# Patient Record
Sex: FEMALE | Race: ASIAN | NOT HISPANIC OR LATINO | Employment: STUDENT | URBAN - METROPOLITAN AREA
[De-identification: names, ages, dates, MRNs, and addresses within clinical notes are randomized per-mention and may not be internally consistent; named-entity substitution may affect disease eponyms.]

---

## 2021-06-03 ENCOUNTER — OFFICE VISIT (OUTPATIENT)
Dept: DERMATOLOGY | Facility: CLINIC | Age: 21
End: 2021-06-03
Payer: COMMERCIAL

## 2021-06-03 DIAGNOSIS — L20.84 INTRINSIC ATOPIC DERMATITIS: Primary | ICD-10-CM

## 2021-06-03 PROCEDURE — 99203 OFFICE O/P NEW LOW 30 MIN: CPT | Performed by: PHYSICIAN ASSISTANT

## 2021-06-03 RX ORDER — TACROLIMUS 0.3 MG/G
OINTMENT TOPICAL
Qty: 60 G | Refills: 1 | Status: SHIPPED | OUTPATIENT
Start: 2021-06-03

## 2021-06-03 RX ORDER — NORETHINDRONE ACETATE/ETHINYL ESTRADIOL AND FERROUS FUMARATE 1MG-20(21)
1 KIT ORAL DAILY
COMMUNITY
Start: 2021-03-14

## 2021-06-03 RX ORDER — TRIAMCINOLONE ACETONIDE 1 MG/G
OINTMENT TOPICAL
Qty: 80 G | Refills: 3 | Status: SHIPPED | OUTPATIENT
Start: 2021-06-03 | End: 2022-08-18

## 2021-06-03 ASSESSMENT — PAIN SCALES - GENERAL: PAINLEVEL: NO PAIN (0)

## 2021-06-03 NOTE — LETTER
Date:June 25, 2021      Patient was self referred, no letter generated. Do not send.        [facility] Health Information

## 2021-06-03 NOTE — NURSING NOTE
Dermatology Rooming Note    Crys Steel's goals for this visit include:   Chief Complaint   Patient presents with     Skin Check     Crys is here for a skin check. She has chronic dry and flaky skin. She does note burning on her skin as well.     Mercedes Brown CMA

## 2021-06-03 NOTE — LETTER
6/3/2021       RE: Crys Steel  [id number] [address] [facility] [address]     Dear Colleague,    Thank you for referring your patient, Crys Steel, to the [facility] DERMATOLOGY [facility] [place] at [facility]. Please see a copy of my visit note below.    [facility] Dermatology Note  Encounter Date: Eduard 3, 2021  Office Visit      Dermatology Problem List:  1. Intrinsic Atopic dermatitis, diffuse, flaring currently  -protopic 0.03% oint - face, triamcinolone 0.1% ointment- trunk  -wet wraps, cerave moinsturizer  ____________________________________________    Assessment & Plan:  # Diffuse atopic dermatitis. Face, arms, legs, trunk. Ongoing past two yars. No hx of atopy, no fhx atopic derm.   - Edu on etiology, underlying cause  - dry skin care hand out provided and discussed in detail  -wet wraps tonight and for next 2-3 days - instructions provided to patient  -edu on importance of diligent emollients several times daily  -protopic 0.03% oint to face BID  -TAC 0.1% ointment to hot spots on trunk/extremities  -steroid edu given  -future: phototherapy    Procedures Performed:   none    Follow-up: 6 week(s) in-person, or earlier for new or changing lesions    Staff:     All risks, benefits and alternatives were discussed with patient.  Patient is in agreement and understands the assessment and plan.  All questions were answered.    Estela Christiansen PA-C, MPAS  [facility]: Phone: [phone], Fax: [phone]  [facility]: Phone: [phone],  Fax: [phone]  ____________________________________________    CC: Skin Check (Crys is here for a skin check. She has chronic dry and flaky skin. She does note burning on her skin as well.)      HPI:  Ms. Crys Steel is a 20 year old female who presents today as a new  patient for a Skin rash. She has had it for the past two years. No hx of eczema as a child. Very itchy. No fhx with atopic derm, allergies or eczema. Gets worse in the winter. Does not spend a lot of time in the sun. Currently using honest brand eczema oat mild body wash for babies. She tried la rosh possay eczema lotion, previously used aveeno without benefit. She tried vanicream as well, no benefit. She tried cortisone cream which helped a little bit. Previously had a rx ointment which worked better, but unsure of what this is.     Patient is otherwise feeling well, without additional concerns.    Labs:  none    Physical Exam:  Vitals: There were no vitals taken for this visit.  SKIN: Full skin, which includes the head/face, both arms, chest, back, abdomen,both legs, genitalia and/or groin buttocks, digits and/or nails, was examined.   - Chandra's skin type III  - There are pink scaly patches and plaques on the face, bilateral arms, legs, trunk, back, chest, buttocks - fairly diffuse.   - No other lesions of concern on areas examined.     Medications:  Current Outpatient Medications   Medication     ALEJANDRO FE 1/20 1-20 MG-MCG tablet     No current facility-administered medications for this visit.       Past Medical/Surgical History:   There is no problem list on file for this patient.    History reviewed. No pertinent past medical history.    CC Dr. Dr. Kilgore on close of this encounter.                   Again, thank you for allowing me to participate in the care of your patient.      Sincerely,    Estela Christiansen PA-C

## 2021-06-03 NOTE — PROGRESS NOTES
[facility] Dermatology Note  Encounter Date: Eduard 3, 2021  Office Visit      Dermatology Problem List:  1. Intrinsic Atopic dermatitis, diffuse, flaring currently  -protopic 0.03% oint - face, triamcinolone 0.1% ointment- trunk  -wet wraps, cerave moinsturizer  ____________________________________________    Assessment & Plan:  # Diffuse atopic dermatitis. Face, arms, legs, trunk. Ongoing past two yars. No hx of atopy, no fhx atopic derm.   - Edu on etiology, underlying cause  - dry skin care hand out provided and discussed in detail  -wet wraps tonight and for next 2-3 days - instructions provided to patient  -edu on importance of diligent emollients several times daily  -protopic 0.03% oint to face BID  -TAC 0.1% ointment to hot spots on trunk/extremities  -steroid edu given  -future: phototherapy    Procedures Performed:   none    Follow-up: 6 week(s) in-person, or earlier for new or changing lesions    Staff:     All risks, benefits and alternatives were discussed with patient.  Patient is in agreement and understands the assessment and plan.  All questions were answered.    Estela Christiansen PA-C, MPAS  [facility]: Phone: [phone], Fax: [phone]  [facility]: Phone: [phone],  Fax: [phone]  ____________________________________________    CC: Skin Check (Crys is here for a skin check. She has chronic dry and flaky skin. She does note burning on her skin as well.)      HPI:  Ms. Crys Steel is a 20 year old female who presents today as a new patient for a Skin rash. She has had it for the past two years. No hx of eczema as a child. Very itchy. No fhx with atopic derm, allergies or eczema. Gets worse in the winter. Does not spend a lot of time in the sun. Currently using honest brand eczema oat mild body wash for babies. She tried la rosh possay eczema lotion, previously used  aveeno without benefit. She tried vanicream as well, no benefit. She tried cortisone cream which helped a little bit. Previously had a rx ointment which worked better, but unsure of what this is.     Patient is otherwise feeling well, without additional concerns.    Labs:  none    Physical Exam:  Vitals: There were no vitals taken for this visit.  SKIN: Full skin, which includes the head/face, both arms, chest, back, abdomen,both legs, genitalia and/or groin buttocks, digits and/or nails, was examined.   - Chandra's skin type III  - There are pink scaly patches and plaques on the face, bilateral arms, legs, trunk, back, chest, buttocks - fairly diffuse.   - No other lesions of concern on areas examined.     Medications:  Current Outpatient Medications   Medication     ALEJANDRO FE 1/20 1-20 MG-MCG tablet     No current facility-administered medications for this visit.       Past Medical/Surgical History:   There is no problem list on file for this patient.    History reviewed. No pertinent past medical history.    CC Dr. Dr. Kilgore on close of this encounter.

## 2021-06-03 NOTE — PATIENT INSTRUCTIONS
WET WRAPS    -After bathing/showering  -Put on Rx cream to hot spots followed by cerave cream head to toe  -Put on damp sweat pants/leggings/sweatshirt/long sleeve t-shirt, followed by a dry layer and leave on over night for for a minimul for 3-4 hours  -Repeat 2-3 nights in a row    Rx:  elidel - face  Triamcinolone - trunk/arms/legs    Products:  Cetaphil/cerave soap liquid or bar  Cerave cream - IN A JAR the plain original one      Dry Skin    What is dry skin?    Common skin problem    Can be worse during the winter     Affects all ages    Occurs in people with or without other skin problems    What does it look like?    Fine lines in the skin become more visible     Rough feeling skin     Flaky skin    Most common on the arms and legs    Skin can become cracked, especially on the hands and feet    What are some problems caused by dry skin?     Itching    Rubbing or scratching can cause thickened, rough skin patches    Cracks in skin can be painful    Red, itchy, scaly skin (called eczema) can occur    Yellow crusting or pus could be signs of an infection    What causes dry skin?    A lack of water in the top layer of the skin    Too much soapy water,  hot water, or harsh chemicals    Aging and sun damage    How do I treat dry skin?    Shower or bathe daily for under ten minutes with lukewarm water and mild soap.    Pat yourself dry with a towel gently and leave your skin slightly damp.    Use moisturizing cream or ointment right away.  Avoid lotions.    What kind of mild soap should I be using?    Camay , Dove , Tone , Neutrogena , Purpose , or Oil of Olay     A non-detergent cleanser, like Cetaphil , can be used.    What should I stay away from?    Scented soaps     Bath oils    What moisturizers should I be using?    Cetaphil Cream,CeraVe Cream, Vanicream, Aquaphilic, Eucerin, Aquaphor, or Vaseline     Always apply after showering or bathing.    Reapply throughout the day, if possible.    If dry skin affects  your hands, always reapply after handwashing.    What else should I know?    Using a humidifier during winter months may help.    If dry skin gets worse or if eczema develops, a steroid cream may be needed.

## 2021-06-30 NOTE — PROGRESS NOTES
[facility] Dermatology Note  Encounter Date: Jul 1, 2021  Office Visit      Dermatology Problem List:  # Intrinsic Atopic dermatitis, diffuse, flaring currently  - face TAC 0.025%, body TAC 0.1%  - gentle skin cares   - culture and mupirocin for impetigo  ____________________________________________    Assessment & Plan:   # Diffuse atopic dermatitis with impetiginization. Face, arms, legs, trunk. Ongoing past two years. No hx of atopy, no fhx atopic derm.     - gentle skin care education   - continue TAC 0.1% on body twice daily   - culture for impetigo   - start mupirocin BID for impetiginization   - stop protopic   - start TAC 0.25% on the face BID   - recommend thicker cerave moisturizing cream   - referral for patch testing   - future: phototherapy    Procedures Performed:   none    Follow-up: 4 weeks in person     Staff & resident:     Sandi Olson MD     STAFF: BEV     I have personally examined this patient and agree with Dr. Olosn's documentation and plan of care. I have reviewed and amended the resident's note above. The documentation accurately reflects my clinical observations, diagnoses, treatment and follow-up plans.     Jeannette Ackerman MD  Dermatology Staff      ____________________________________________    CC: Derm Problem (Crys is heretoday for a 1 month follow up - going good )      HPI:  Ms. Crys Steel is a 20 year old female who presents today as a return patient for atopic dermatitis.  She has had it for the past two years. No hx of eczema as a child. Very itchy. No fhx with atopic derm, allergies or eczema. Gets worse in the winter. Last seen by Kuldip and she did a few days of wet wraps.  Currently using triam 0.1 for body, protopic for face, and a coritsone moisturizer.  Thinks body is 90% better.  Face burns with prootpic.  Also has open scabs on the face and redness that is not healing.      Patient is otherwise feeling well, without  additional concerns.    Labs:  none    Physical Exam:  Vitals: There were no vitals taken for this visit.  SKIN: Full skin, which includes the head/face, both arms, chest, back, abdomen,both legs, genitalia and/or groin buttocks, digits and/or nails, was examined.   - Chandra's skin type III  - There are pink scaly patches and plaques on the face, bilateral arms, legs, trunk, back, chest, buttocks - fairly diffuse.   - No other lesions of concern on areas examined.     Medications:  Current Outpatient Medications   Medication     ALEJANDRO FE 1/20 1-20 MG-MCG tablet     tacrolimus (PROTOPIC) 0.03 % external ointment     triamcinolone (KENALOG) 0.1 % external ointment     No current facility-administered medications for this visit.       Past Medical/Surgical History:   There is no problem list on file for this patient.    No past medical history on file.    CC Dr. Dr. Kilgore on close of this encounter.

## 2021-07-01 ENCOUNTER — OFFICE VISIT (OUTPATIENT)
Dept: DERMATOLOGY | Facility: CLINIC | Age: 21
End: 2021-07-01
Payer: COMMERCIAL

## 2021-07-01 DIAGNOSIS — L29.9 PRURITUS: ICD-10-CM

## 2021-07-01 DIAGNOSIS — L01.00 IMPETIGO: Primary | ICD-10-CM

## 2021-07-01 DIAGNOSIS — L85.3 XEROSIS CUTIS: ICD-10-CM

## 2021-07-01 DIAGNOSIS — L20.89 OTHER ATOPIC DERMATITIS: ICD-10-CM

## 2021-07-01 PROCEDURE — 87186 SC STD MICRODIL/AGAR DIL: CPT | Mod: 90 | Performed by: PATHOLOGY

## 2021-07-01 PROCEDURE — 99000 SPECIMEN HANDLING OFFICE-LAB: CPT | Performed by: PATHOLOGY

## 2021-07-01 PROCEDURE — 99214 OFFICE O/P EST MOD 30 MIN: CPT | Mod: GC

## 2021-07-01 PROCEDURE — 87070 CULTURE OTHR SPECIMN AEROBIC: CPT | Mod: 90 | Performed by: PATHOLOGY

## 2021-07-01 PROCEDURE — 87077 CULTURE AEROBIC IDENTIFY: CPT | Mod: 90 | Performed by: PATHOLOGY

## 2021-07-01 RX ORDER — TRIAMCINOLONE ACETONIDE 0.25 MG/G
OINTMENT TOPICAL 2 TIMES DAILY
Qty: 80 G | Refills: 11 | Status: SHIPPED | OUTPATIENT
Start: 2021-07-01 | End: 2022-08-18

## 2021-07-01 RX ORDER — MUPIROCIN 20 MG/G
OINTMENT TOPICAL
Qty: 30 G | Refills: 3 | Status: SHIPPED | OUTPATIENT
Start: 2021-07-01

## 2021-07-01 ASSESSMENT — PAIN SCALES - GENERAL: PAINLEVEL: MILD PAIN (2)

## 2021-07-01 NOTE — LETTER
7/1/2021       RE: Crys Steel  [id number] [address] [facility] [address]     Dear Colleague,    Thank you for referring your patient, Crys Steel, to the [facility] DERMATOLOGY [facility]INIC [place] at [facility]. Please see a copy of my visit note below.    [facility] Dermatology Note  Encounter Date: Jul 1, 2021  Office Visit      Dermatology Problem List:  # Intrinsic Atopic dermatitis, diffuse, flaring currently  - face TAC 0.025%, body TAC 0.1%  - gentle skin cares   - culture and mupirocin for impetigo  ____________________________________________    Assessment & Plan:   # Diffuse atopic dermatitis with impetiginization. Face, arms, legs, trunk. Ongoing past two years. No hx of atopy, no fhx atopic derm.     - gentle skin care education   - continue TAC 0.1% on body twice daily   - culture for impetigo   - start mupirocin BID for impetiginization   - stop protopic   - start TAC 0.25% on the face BID   - recommend thicker cerave moisturizing cream   - referral for patch testing   - future: phototherapy    Procedures Performed:   none    Follow-up: 4 weeks in person     Staff & resident:     Sandi Olson MD     STAFF: BEV     I have personally examined this patient and agree with Dr. Olson's documentation and plan of care. I have reviewed and amended the resident's note above. The documentation accurately reflects my clinical observations, diagnoses, treatment and follow-up plans.     Jeannette Ackerman MD  Dermatology Staff      ____________________________________________    CC: Derm Problem (Crys is heretoday for a 1 month follow up - going good )      HPI:  Ms. Crys Steel is a 20 year old female who presents today as a return patient for atopic dermatitis.  She has had it for the past two years. No hx of eczema as a child. Very itchy. No fhx with atopic derm, allergies or eczema.  Gets worse in the winter. Last seen by Kuldip and she did a few days of wet wraps.  Currently using triam 0.1 for body, protopic for face, and a coritsone moisturizer.  Thinks body is 90% better.  Face burns with prootpic.  Also has open scabs on the face and redness that is not healing.      Patient is otherwise feeling well, without additional concerns.    Labs:  none    Physical Exam:  Vitals: There were no vitals taken for this visit.  SKIN: Full skin, which includes the head/face, both arms, chest, back, abdomen,both legs, genitalia and/or groin buttocks, digits and/or nails, was examined.   - Chandra's skin type III  - There are pink scaly patches and plaques on the face, bilateral arms, legs, trunk, back, chest, buttocks - fairly diffuse.   - No other lesions of concern on areas examined.     Medications:  Current Outpatient Medications   Medication     ALEJANDRO FE 1/20 1-20 MG-MCG tablet     tacrolimus (PROTOPIC) 0.03 % external ointment     triamcinolone (KENALOG) 0.1 % external ointment     No current facility-administered medications for this visit.       Past Medical/Surgical History:   There is no problem list on file for this patient.    No past medical history on file.    CC Dr. Dr. Kilgore on close of this encounter.

## 2021-07-01 NOTE — PATIENT INSTRUCTIONS
Use mupirocin ointment 2-3 times daily on areas of crusting on the face (open scabs)     Use triamcinolone 0.025% ointment twice daily for the rest of the face for eczema     Use triamcinolone 0.1% ointment twice daily for body     Use cerave moisturizing cream immediately after shower, otherwise you can use vaseline or aquaphor immediately after shower     Allergy clinic will call you regarding patch testing

## 2021-07-01 NOTE — NURSING NOTE
Dermatology Rooming Note    Crys Steel's goals for this visit include:   Chief Complaint   Patient presents with     Derm Problem     Crys is heretoday for a 1 month follow up - going good      SENIA Hoffman

## 2021-07-03 LAB
BACTERIA SPEC CULT: ABNORMAL
Lab: ABNORMAL
SPECIMEN SOURCE: ABNORMAL

## 2021-07-08 NOTE — RESULT ENCOUNTER NOTE
Attempted to reach patient x2 regarding results of culture and inquire about her progress.  Unable to leave voicemail and does not have mychart.  Will reattempt tomorrow.

## 2021-07-11 NOTE — RESULT ENCOUNTER NOTE
I called to inform patient of the results of her skin culture.  She informed me that the crusty infected lesions had increased in spite of mupirocin and two days ago she went to urgent care ([facility]) where they did a swab and prescribed 1 week of doxycyline.  She is taking doxycyline along with mupirocin at this time.  She thinks her skin is improving aside from the persistent lesion on the upper cutaneous lip.  She denies systemic symptoms of fever, chills.  She has follow up scheduled with me on 7/29.  I will call her later this week to ensure continued improvement and she will attempt to send in photos to review.

## 2021-08-19 ENCOUNTER — OFFICE VISIT (OUTPATIENT)
Dept: DERMATOLOGY | Facility: CLINIC | Age: 21
End: 2021-08-19
Payer: COMMERCIAL

## 2021-08-19 ENCOUNTER — TELEPHONE (OUTPATIENT)
Dept: DERMATOLOGY | Facility: CLINIC | Age: 21
End: 2021-08-19

## 2021-08-19 DIAGNOSIS — L20.89 OTHER ATOPIC DERMATITIS: ICD-10-CM

## 2021-08-19 DIAGNOSIS — L29.9 PRURITUS: ICD-10-CM

## 2021-08-19 DIAGNOSIS — L85.3 XEROSIS CUTIS: ICD-10-CM

## 2021-08-19 DIAGNOSIS — L01.00 IMPETIGO: Primary | ICD-10-CM

## 2021-08-19 PROCEDURE — 99214 OFFICE O/P EST MOD 30 MIN: CPT | Mod: GC

## 2021-08-19 ASSESSMENT — PAIN SCALES - GENERAL: PAINLEVEL: NO PAIN (0)

## 2021-08-19 NOTE — LETTER
8/19/2021       RE: Crys Steel  [id number] [address] [facility] [address]     Dear Colleague,    Thank you for referring your patient, Crys Steel, to the [facility] DERMATOLOGY CLINIC [place] at [facility]. Please see a copy of my visit note below.    [facility] Dermatology Note  Encounter Date: Aug 19, 2021  Office Visit      Dermatology Problem List:  # Intrinsic Atopic dermatitis  - face TAC 0.025%, body TAC 0.1%, mupirocin for impetiginized areas on face   - start dupixent, PA submitted   - gentle skin cares   - culture and mupirocin for impetigo  - previous tx: failed protopic   ____________________________________________    Assessment & Plan:   # Diffuse atopic dermatitis with impetiginization.   Possibly ACD.  Face, arms, legs, trunk. Ongoing past two years. No hx of atopy, no fhx atopic derm.   She has failed TAC 0.1% and protopic and she is getting areas of impetiginization on the face.  Therefore it is appropriate to start dupixent.  We have also scheduled her for patch testing with Dr. Figueroa.   - gentle skin care education   - continue TAC 0.1% on body twice daily   - start TAC 0.1% for face BID x1 week then switch back to TAC 0.025% BID for face   - continue mupirocin BID for impetiginized  Areas   - referral for patch testing   - start dupixent, PA submitted     Procedures Performed:   none    Follow-up: 6 weeks in person     Staff & resident:     Sandi Olson MD     STAFF: BEV     I have personally examined this patient and agree with Dr. Olson's documentation and plan of care. I have reviewed and amended the resident's note above. The documentation accurately reflects my clinical observations, diagnoses, treatment and follow-up plans.     Jeannette Ackerman MD  Dermatology Staff    ____________________________________________    CC: Derm Problem (Crys is here today for a  follow up for Diffuse atopic dermatitis with impetiginization- got a lot better)      HPI:  Ms. Crys Steel is a 20 year old female who presents today as a return patient for atopic dermatitis.  She has had it for the past two years. No hx of eczema as a child. Very itchy. No fhx with atopic derm, allergies or eczema. Gets worse in the winter.  She was seen by myself in 7/1/21.  We started her on TAC 0.1% ointment bID for body and TAC 0.025% ointment for face with mupirocin for impetiginized areas.  Currently using as above with vanicream moisturizer BID.  She has tried and failed protopic.  Thinks her arms are improved but overall still heavy eczema, especially on face.  Referral to patch testing was ordered but never fulfilled.     Patient is otherwise feeling well, without additional concerns.    Labs:  none    Physical Exam:  Vitals: There were no vitals taken for this visit.  SKIN: Full skin, which includes the head/face, both arms, chest, back, abdomen,both legs, genitalia and/or groin buttocks, digits and/or nails, was examined.   - Chandra's skin type III  - There are pink scaly patches and plaques on the face, bilateral arms, legs, trunk, back, chest, buttocks - fairly diffuse.  - on the face there are impetiginized lesions on the cutaneous upper lip and the right forehead    - No other lesions of concern on areas examined.     Medications:  Current Outpatient Medications   Medication     ALEJANDRO FE 1/20 1-20 MG-MCG tablet     mupirocin (BACTROBAN) 2 % external ointment     tacrolimus (PROTOPIC) 0.03 % external ointment     triamcinolone (KENALOG) 0.025 % external ointment     triamcinolone (KENALOG) 0.1 % external ointment     No current facility-administered medications for this visit.      Past Medical/Surgical History:   There is no problem list on file for this patient.    No past medical history on file.    CC Dr. Dr. Kilgore on close of this encounter.

## 2021-08-19 NOTE — TELEPHONE ENCOUNTER
Prior Authorization Approval    Authorization Effective Date: 7/20/2021  Authorization Expiration Date: 12/17/2021  Medication: Dupixent- Approved  Approved Dose/Quantity:300mg  Reference #: [id number]   Insurance Company: [employer] - Phone [phone] Fax [phone]  Expected CoPay: $3     CoPay Card Available: No    Foundation Assistance Needed: N/A  Which Pharmacy is filling the prescription (Not needed for infusion/clinic administered): [facility] [address]  Pharmacy Notified: Yes  Patient Notified: Yes

## 2021-08-19 NOTE — PROGRESS NOTES
[facility] Dermatology Note  Encounter Date: Aug 19, 2021  Office Visit      Dermatology Problem List:  # Intrinsic Atopic dermatitis  - face TAC 0.025%, body TAC 0.1%, mupirocin for impetiginized areas on face   - start dupixent, PA submitted   - gentle skin cares   - culture and mupirocin for impetigo  - previous tx: failed protopic   ____________________________________________    Assessment & Plan:   # Diffuse atopic dermatitis with impetiginization.   Possibly ACD.  Face, arms, legs, trunk. Ongoing past two years. No hx of atopy, no fhx atopic derm.   She has failed TAC 0.1% and protopic and she is getting areas of impetiginization on the face.  Therefore it is appropriate to start dupixent.  We have also scheduled her for patch testing with Dr. Figueroa.   - gentle skin care education   - continue TAC 0.1% on body twice daily   - start TAC 0.1% for face BID x1 week then switch back to TAC 0.025% BID for face   - continue mupirocin BID for impetiginized  Areas   - referral for patch testing   - start dupixent, PA submitted     Procedures Performed:   none    Follow-up: 6 weeks in person     Staff & resident:     Sandi Olson MD     STAFF: BEV     I have personally examined this patient and agree with Dr. Olson's documentation and plan of care. I have reviewed and amended the resident's note above. The documentation accurately reflects my clinical observations, diagnoses, treatment and follow-up plans.     Jeannette Ackerman MD  Dermatology Staff    ____________________________________________    CC: Derm Problem (Crys is here today for a follow up for Diffuse atopic dermatitis with impetiginization- got a lot better)      HPI:  Ms. Crys Steel is a 20 year old female who presents today as a return patient for atopic dermatitis.  She has had it for the past two years. No hx of eczema as a child. Very itchy. No fhx with atopic derm, allergies or eczema. Gets  worse in the winter.  She was seen by myself in 7/1/21.  We started her on TAC 0.1% ointment bID for body and TAC 0.025% ointment for face with mupirocin for impetiginized areas.  Currently using as above with vanicream moisturizer BID.  She has tried and failed protopic.  Thinks her arms are improved but overall still heavy eczema, especially on face.  Referral to patch testing was ordered but never fulfilled.     Patient is otherwise feeling well, without additional concerns.    Labs:  none    Physical Exam:  Vitals: There were no vitals taken for this visit.  SKIN: Full skin, which includes the head/face, both arms, chest, back, abdomen,both legs, genitalia and/or groin buttocks, digits and/or nails, was examined.   - Chandra's skin type III  - There are pink scaly patches and plaques on the face, bilateral arms, legs, trunk, back, chest, buttocks - fairly diffuse.  - on the face there are impetiginized lesions on the cutaneous upper lip and the right forehead    - No other lesions of concern on areas examined.     Medications:  Current Outpatient Medications   Medication     ALEJANDRO FE 1/20 1-20 MG-MCG tablet     mupirocin (BACTROBAN) 2 % external ointment     tacrolimus (PROTOPIC) 0.03 % external ointment     triamcinolone (KENALOG) 0.025 % external ointment     triamcinolone (KENALOG) 0.1 % external ointment     No current facility-administered medications for this visit.      Past Medical/Surgical History:   There is no problem list on file for this patient.    No past medical history on file.    CC Dr. Dr. Kilgore on close of this encounter.

## 2021-08-19 NOTE — NURSING NOTE
Dermatology Rooming Note    Crys Steel's goals for this visit include:   Chief Complaint   Patient presents with     Derm Problem     Crys is here today for a follow up for Diffuse atopic dermatitis with impetiginization- got a lot better     Karissa Machuca, BÁRBARAA

## 2021-08-19 NOTE — PATIENT INSTRUCTIONS
"For the next week start Triamcinolone 0.1% ointment for the face twice daily.  Then go back to triamcinolone 0.025% twice daily for face.    Continue Triam 0.1% twice daily for body.    Vanicream 1-2 times daily   Continue mupirocin for \"infeccted\" areas on face 2-3 times daily.    "

## 2021-08-19 NOTE — TELEPHONE ENCOUNTER
PA Initiation    Medication: Dupixent- PENDING   Insurance Company: [employer] - Phone [phone] Fax [phone]  Pharmacy Filling the Rx: [facility] - [place], [place] - [facility] [address]  Filling Pharmacy Phone:    Filling Pharmacy Fax:    Start Date: 8/19/2021

## 2021-09-15 NOTE — TELEPHONE ENCOUNTER
FUTURE VISIT INFORMATION      FUTURE VISIT INFORMATION:    Date: 9.20.21    Time: 3:30 PM    Location:  Allergy  REFERRAL INFORMATION:    Referring provider:  Tyron    Referring providers clinic:  [facility] Dermatology    Reason for visit/diagnosis  atopic dermatitis  RECORDS REQUESTED FROM:       Clinic name Comments Records Status Imaging Status   [facility] Derm 8.19.21 [facility]  7.1.21 [facility]  6.3.21 [facility]

## 2021-09-18 ENCOUNTER — HEALTH MAINTENANCE LETTER (OUTPATIENT)
Age: 21
End: 2021-09-18
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
## 2021-09-20 ENCOUNTER — PRE VISIT (OUTPATIENT)
Dept: ALLERGY | Facility: CLINIC | Age: 21
End: 2021-09-20

## 2021-09-20 ENCOUNTER — OFFICE VISIT (OUTPATIENT)
Dept: ALLERGY | Facility: CLINIC | Age: 21
End: 2021-09-20
Payer: COMMERCIAL

## 2021-09-20 DIAGNOSIS — H10.10 ALLERGIC RHINOCONJUNCTIVITIS: ICD-10-CM

## 2021-09-20 DIAGNOSIS — L23.89 ALLERGIC CONTACT DERMATITIS DUE TO OTHER AGENTS: ICD-10-CM

## 2021-09-20 DIAGNOSIS — J30.9 ALLERGIC RHINOCONJUNCTIVITIS: ICD-10-CM

## 2021-09-20 DIAGNOSIS — L20.89 OTHER ATOPIC DERMATITIS: Primary | ICD-10-CM

## 2021-09-20 PROCEDURE — 99214 OFFICE O/P EST MOD 30 MIN: CPT | Performed by: DERMATOLOGY

## 2021-09-20 ASSESSMENT — PAIN SCALES - GENERAL: PAINLEVEL: NO PAIN (0)
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
## 2021-09-20 NOTE — NURSING NOTE
Dermatology Rooming Note    Crys Steel's goals for this visit include:   Chief Complaint   Patient presents with     Allergy Consult     Crys is here today for a allergy consult      SENIA Hoffman

## 2021-09-20 NOTE — PATIENT INSTRUCTIONS
Patch Testing Information  What are allergen patch tests?    The test is done to look for skin allergies that may be causing rashes and irritation.    A patch test is a way of identifying whether a substance has caused a delayed reaction with skin inflammation, such as contact eczema or delayed (after days) reactions to drugs.     We will use various types of test compounds, which may include common allergens you may come in contact with in daily life such as preservatives, fragrances or even drugs.     Most of the time we will use standardized, prefabricated test solutions. The choice of the substances and series tested will depend on your history of reactions. Sometimes we will test your own products as well.     In order to avoid severe toxic reactions we need detailed information or safety sheets for each of the test compounds.    The test panels are set up with a small amount of common substances that cause skin allergies. They are taped to your skin with a clear hypoallergenic bandage and reinforced hypoallergenic tape.    The substances are numbered, so it is easy to tell what is causing a skin reaction.  What do I need to do in preparation for the test?    Stop all systemic steroids 1 month prior to the testing.     Stop applying topical steroids to the test area one week prior to the test. It is to use them elsewhere throughout the testing process. If this is not possible then discuss options with the Allergy specialist.    Do not go sunbathing or tanning for one week prior to testing    It is okay to take antihistamines as normal.     Please wear dark colors the week of the test since we will write on you with a dark marker that may transfer and stain clothing or bedding.     Some medications can affect the reactions to allergens during the tests. Therefore reveal all the medications you take to the allergy team. The doctor will discuss the medications with you before the tests.     Eat how you normally  would.    Avoid the following:    You cannot get the test area wet during the entire test period. This means no bathing or swimming the entire test period.    No strenuous exercise that may cause sweating.    Do not scratch the test area, this can cause the allergen to spread and give us false positives.     Avoid exposure to UV irradiation. This means no tanning or UV treatment during the testing period.   What can I expect?    Patch testing is done over three appointments.   o The usual schedule is: Monday (Allergen patches are placed), Wednesday (Patches are removed and skin is examined by the MD), and Friday (Skin is examined by the MD)      If you are allergic, there will be an area of irritation where the test was placed.    Itching or burning at the test site might happen if you are allergic to the allergen.  Do not rub or scratch the test site since this may spread the allergen and possibly cause false positives. If itching or burning is not tolerable please contact the clinic.    The marker we draw on your back with ma take up to 5 weeks to wash off completely. Rubbing alcohol can help speed up this process.     Reactions can occur 1 to 2 weeks after the tests are applied. If this happens please take photos of the area and contact the clinic.  What should I do after the tests are placed?    Keep the area dry. No showering or getting the test area wet from the time we see you at your first visit until after your third appointment. If you get the test area wet you are washing off the test and we could get false negative reactions.    If you notice any of the test patches coming loose put on more tape to re-secure the test.    If the marker that is applied fades you can use a dark pen to micheal around the panel sites.    Cover the test area when you are outside to avoid any sun exposure while the test is in place.     You can remove the tests only if there is a severe reaction (itch, pain, blistering). Please  report this to your doctor immediately. If you have to remove the tests please micheal the locations of each test field with a grid so we can identify the allergen.  WHAT ARE THE POSSIBLE RISKS OF PATCH TESTS     If you are allergic to a compound tested you will develop a localized skin reaction similar to your previous reaction, this may take days to develop. These reactions include a formation of red, itchy skin lesions that could be about a centimeter with small vesicles or possibly even blisters. The lesions will fade over time, this may take weeks. The test might leave some skin pigmentation for a few months.     In rare cases a localized reaction to patch testing can become generalized.     The tests with your own products might have some risks because they are not standardized and unanticipated reactions could occur. We need as much information as possible to evaluate if your own products are safe to test and under what conditions. This has to be evaluated for each individual product.   Useful Contact Information    To contact your doctor you can either send a Pradama message or call [phone]    Address  o [address] [place] [address], [address]    If you develop any serious or adverse allergic reaction after office hours please seek immediate medical assistance at the nearest clinic, urgent care, or emergency room.

## 2021-09-24 ENCOUNTER — APPOINTMENT (OUTPATIENT)
Dept: ALLERGY | Facility: CLINIC | Age: 21
End: 2021-09-24
Payer: COMMERCIAL

## 2021-10-08 ENCOUNTER — ALLIED HEALTH/NURSE VISIT (OUTPATIENT)
Dept: ALLERGY | Facility: CLINIC | Age: 21
End: 2021-10-08
Payer: COMMERCIAL

## 2021-10-08 DIAGNOSIS — L23.89 ALLERGIC CONTACT DERMATITIS DUE TO OTHER AGENTS: Primary | ICD-10-CM

## 2021-10-08 PROCEDURE — 99207 PR NO CHARGE NURSE ONLY: CPT | Performed by: DERMATOLOGY

## 2021-10-08 NOTE — PROGRESS NOTES
Crys Jessicajose Damián comes into clinic today at the request of  Ordering Provider for supervision of Dupixent injection.     This service provided today was under the supervising provider of the day , who was available if needed.    Koki Mcdaniel RN

## 2021-10-21 ENCOUNTER — OFFICE VISIT (OUTPATIENT)
Dept: DERMATOLOGY | Facility: CLINIC | Age: 21
End: 2021-10-21
Payer: COMMERCIAL

## 2021-10-21 DIAGNOSIS — L20.84 INTRINSIC ATOPIC DERMATITIS: Primary | ICD-10-CM

## 2021-10-21 PROCEDURE — 99213 OFFICE O/P EST LOW 20 MIN: CPT | Mod: GC

## 2021-10-21 ASSESSMENT — PAIN SCALES - GENERAL: PAINLEVEL: NO PAIN (0)

## 2021-10-21 NOTE — NURSING NOTE
Dermatology Rooming Note    Crys Steel's goals for this visit include:   Chief Complaint   Patient presents with     Derm Problem     Dermatitis - improvement with Dupixent     Christa Molina, CMA

## 2021-10-21 NOTE — PROGRESS NOTES
[facility] Dermatology Note  Encounter Date: Oct 21, 2021  Office Visit      Dermatology Problem List:  # Intrinsic Atopic dermatitis  - dupixent (started 8/2021), face TAC 0.025%, body TAC 0.1%, mupirocin for impetiginized areas on face, gentle skin cares  - previous: failed protopic   ____________________________________________    Assessment & Plan:   # Diffuse atopic dermatitis with impetiginization.   Much improved after starting dupixent.  Possibe ACD.  Face, arms, legs, trunk. Ongoing past two years. No hx of atopy, no fhx atopic derm.     - gentle skin care education   - continue TAC 0.1% on body twice daily   - continue TAC 0.025% BID for face PRN  - rarely using  - patch testing in November   - continue dupixent     Procedures Performed:   none    Follow-up: 6 months in person     Staff & resident:     Sandi Olson MD     The patient was seen and staffed with Dr. Mustapha MD     Patient was seen and examined with the dermatology resident. I agree with the history, review of systems, physical examination, assessments and plan.    Dotty Luciano MD  Professor and  Chair  Department of Dermatology  [facility]      ____________________________________________    CC: Derm Problem (Dermatitis - improvement with Dupixent)      HPI:  Ms. Crys Steel is a 20 year old female who presents today as a return patient for atopic dermatitis.  She is much improved since starting dupixent 6 weeks ago.  She has patch testing scheduled with Dr. Figueroa in November.  She uses cerave daily.  Has not needed much TAC but uses it when she gets worse.      Patient is otherwise feeling well, without additional concerns.    Labs:  none    Physical Exam:  Vitals: There were no vitals taken for this visit.  SKIN: Focused exam, face, arms, abdomen   - Chandra's skin type III  - Xerotic skin of the face and arms, but overall MUCH better with no impetiginization, no eczematous  patches   - No other lesions of concern on areas examined.     Medications:  Current Outpatient Medications   Medication     Dupilumab 300 MG/2ML SOPN     Dupilumab 300 MG/2ML SOPN     ALEJANDRO FE 1/20 1-20 MG-MCG tablet     mupirocin (BACTROBAN) 2 % external ointment     tacrolimus (PROTOPIC) 0.03 % external ointment     triamcinolone (KENALOG) 0.025 % external ointment     triamcinolone (KENALOG) 0.1 % external ointment     No current facility-administered medications for this visit.      Past Medical/Surgical History:   There is no problem list on file for this patient.    No past medical history on file.

## 2021-10-21 NOTE — LETTER
10/21/2021       RE: Crys Steel  [id number] [facility] [place] [address]     Dear Colleague,    Thank you for referring your patient, Crys Steel, to the [facility] [place] at [facility]. Please see a copy of my visit note below.    [facility] Dermatology Note  Encounter Date: Oct 21, 2021  Office Visit      Dermatology Problem List:  # Intrinsic Atopic dermatitis  - dupixent (started 8/2021), face TAC 0.025%, body TAC 0.1%, mupirocin for impetiginized areas on face, gentle skin cares  - previous: failed protopic   ____________________________________________    Assessment & Plan:   # Diffuse atopic dermatitis with impetiginization.   Much improved after starting dupixent.  Possibe ACD.  Face, arms, legs, trunk. Ongoing past two years. No hx of atopy, no fhx atopic derm.     - gentle skin care education   - continue TAC 0.1% on body twice daily   - continue TAC 0.025% BID for face PRN  - rarely using  - patch testing in November   - continue dupixent     Procedures Performed:   none    Follow-up: 6 months in person     Staff & resident:     Sandi Olson MD     The patient was seen and staffed with Dr. Mustapha MD     Patient was seen and examined with the dermatology resident. I agree with the history, review of systems, physical examination, assessments and plan.    Dotty Luciano MD  Professor and  Chair  Department of Dermatology  [facility]    ____________________________________________    CC: Derm Problem (Dermatitis - improvement with Dupixent)    HPI:  Ms. Crys Steel is a 20 year old female who presents today as a return patient for atopic dermatitis.  She is much improved since starting dupixent 6 weeks ago.  She has patch testing scheduled with Dr. Figueroa in November.  She uses cerave daily.  Has not needed much TAC but uses it when she gets  worse.      Patient is otherwise feeling well, without additional concerns.    Labs:  none    Physical Exam:  Vitals: There were no vitals taken for this visit.  SKIN: Focused exam, face, arms, abdomen   - Chandra's skin type III  - Xerotic skin of the face and arms, but overall MUCH better with no impetiginization, no eczematous patches   - No other lesions of concern on areas examined.     Medications:  Current Outpatient Medications   Medication     Dupilumab 300 MG/2ML SOPN     Dupilumab 300 MG/2ML SOPN     ALEJANDRO FE 1/20 1-20 MG-MCG tablet     mupirocin (BACTROBAN) 2 % external ointment     tacrolimus (PROTOPIC) 0.03 % external ointment     triamcinolone (KENALOG) 0.025 % external ointment     triamcinolone (KENALOG) 0.1 % external ointment     No current facility-administered medications for this visit.      Past Medical/Surgical History:   There is no problem list on file for this patient.    No past medical history on file.

## 2021-10-22 ENCOUNTER — ALLIED HEALTH/NURSE VISIT (OUTPATIENT)
Dept: ALLERGY | Facility: CLINIC | Age: 21
End: 2021-10-22
Payer: COMMERCIAL

## 2021-10-22 DIAGNOSIS — L20.89 OTHER ATOPIC DERMATITIS: ICD-10-CM

## 2021-10-22 DIAGNOSIS — L23.89 ALLERGIC CONTACT DERMATITIS DUE TO OTHER AGENTS: Primary | ICD-10-CM

## 2021-10-22 PROCEDURE — 99207 PR NO CHARGE NURSE ONLY: CPT | Performed by: DERMATOLOGY

## 2021-10-22 NOTE — NURSING NOTE
Chief Complaint   Patient presents with     Nurse Visit     Supervision of Dupixent injection     Crys Steel comes into clinic today at the request of  Ordering Provider for Dupixent.     This service provided today was under the supervising provider of the day , who was available if needed.    Koki Mcdaniel RN

## 2021-10-27 ENCOUNTER — IMMUNIZATION (OUTPATIENT)
Dept: NURSING | Facility: CLINIC | Age: 21
End: 2021-10-27
Payer: COMMERCIAL

## 2021-10-27 PROCEDURE — 0001A PR COVID VAC PFIZER DIL RECON 30 MCG/0.3 ML IM: CPT

## 2021-10-27 PROCEDURE — 91300 PR COVID VAC PFIZER DIL RECON 30 MCG/0.3 ML IM: CPT

## 2021-11-05 ENCOUNTER — APPOINTMENT (OUTPATIENT)
Dept: ALLERGY | Facility: CLINIC | Age: 21
End: 2021-11-05
Payer: COMMERCIAL

## 2021-11-13 ENCOUNTER — HEALTH MAINTENANCE LETTER (OUTPATIENT)
Age: 21
End: 2021-11-13

## 2021-11-17 ENCOUNTER — IMMUNIZATION (OUTPATIENT)
Dept: NURSING | Facility: CLINIC | Age: 21
End: 2021-11-17
Attending: FAMILY MEDICINE
Payer: COMMERCIAL

## 2021-11-17 PROCEDURE — 0002A PR COVID VAC PFIZER DIL RECON 30 MCG/0.3 ML IM: CPT

## 2021-11-17 PROCEDURE — 91300 PR COVID VAC PFIZER DIL RECON 30 MCG/0.3 ML IM: CPT

## 2021-11-19 ENCOUNTER — ALLIED HEALTH/NURSE VISIT (OUTPATIENT)
Dept: ALLERGY | Facility: CLINIC | Age: 21
End: 2021-11-19
Payer: COMMERCIAL

## 2021-11-19 DIAGNOSIS — L20.89 OTHER ATOPIC DERMATITIS: Primary | ICD-10-CM

## 2021-11-19 PROCEDURE — 99207 PR NO CHARGE NURSE ONLY: CPT

## 2021-11-19 NOTE — PROGRESS NOTES
Crys Ashleyjaguar Steel comes into clinic today at the request of  Ordering Provider for Dupixent injection monitoring.     This service provided today was under the supervising provider of the day , who was available if needed.    Koki Mcdaniel RN

## 2021-11-24 ENCOUNTER — TELEPHONE (OUTPATIENT)
Dept: ALLERGY | Facility: CLINIC | Age: 21
End: 2021-11-24
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
## 2021-11-29 ENCOUNTER — OFFICE VISIT (OUTPATIENT)
Dept: ALLERGY | Facility: CLINIC | Age: 21
End: 2021-11-29
Payer: COMMERCIAL

## 2021-11-29 DIAGNOSIS — L23.89 ALLERGIC CONTACT DERMATITIS DUE TO OTHER AGENTS: ICD-10-CM

## 2021-11-29 DIAGNOSIS — H10.10 ALLERGIC RHINOCONJUNCTIVITIS: ICD-10-CM

## 2021-11-29 DIAGNOSIS — J30.81 ALLERGY TO CATS: ICD-10-CM

## 2021-11-29 DIAGNOSIS — L20.89 OTHER ATOPIC DERMATITIS: Primary | ICD-10-CM

## 2021-11-29 DIAGNOSIS — J30.9 ALLERGIC RHINOCONJUNCTIVITIS: ICD-10-CM

## 2021-11-29 PROCEDURE — 95004 PERQ TESTS W/ALRGNC XTRCS: CPT | Performed by: DERMATOLOGY

## 2021-11-29 PROCEDURE — 95044 PATCH/APPLICATION TESTS: CPT | Performed by: DERMATOLOGY

## 2021-11-29 ASSESSMENT — PAIN SCALES - GENERAL: PAINLEVEL: NO PAIN (0)

## 2021-11-29 NOTE — NURSING NOTE
Chief Complaint   Patient presents with     Allergy Testing Followup     Tamra is here today for Allergy Patch Testing Day 1     Solomon Barberedic
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
## 2021-12-01 ENCOUNTER — OFFICE VISIT (OUTPATIENT)
Dept: ALLERGY | Facility: CLINIC | Age: 21
End: 2021-12-01
Payer: COMMERCIAL

## 2021-12-01 DIAGNOSIS — L23.89 ALLERGIC CONTACT DERMATITIS DUE TO OTHER AGENTS: ICD-10-CM

## 2021-12-01 DIAGNOSIS — J30.9 ALLERGIC RHINOCONJUNCTIVITIS: ICD-10-CM

## 2021-12-01 DIAGNOSIS — H10.10 ALLERGIC RHINOCONJUNCTIVITIS: ICD-10-CM

## 2021-12-01 DIAGNOSIS — J30.81 ALLERGY TO CATS: ICD-10-CM

## 2021-12-01 DIAGNOSIS — L20.89 OTHER ATOPIC DERMATITIS: Primary | ICD-10-CM

## 2021-12-01 PROCEDURE — 99207 PR NO CHARGE LOS: CPT | Mod: 25 | Performed by: DERMATOLOGY

## 2021-12-01 PROCEDURE — 95024 IQ TESTS W/ALLERGENIC XTRCS: CPT | Performed by: DERMATOLOGY

## 2021-12-01 NOTE — NURSING NOTE
Dermatology Rooming Note    Crys Steel's goals for this visit include:   Chief Complaint   Patient presents with     Allergy Recheck     patch testing day 3     Christa Molina CMA
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
## 2021-12-01 NOTE — PATIENT INSTRUCTIONS
Patient:   MICKI NELSON            MRN: [id number]            FIN: [id number]              Age:   2 years     Sex:  FEMALE     :  15   Associated Diagnoses:   Abdominal distention; Impacted stool in rectum  Author:   CLAIRE STATON     Basic Information   Time seen: Date & time 20 14:48:00.     History of Present Illness   2 y/o female with a history of VSD with spontaneous closure, Down syndrome and Hirschsprung's disease sp ostomy and ostomy take down as well as G tube here with abdominal distension over the past 3 weeks.  Parents report that it seems to be related to when they changed the G tube at home.  She has toelrated all of her feeds and is reported to have loose pasty stool daily.  She takes po foods and liquids and is supplemented with 240ml pediasure  1.0: 5 x per day. She is comfortable per dad and does not appear to be in pain. No reported bowel regimen at home. No fevers, No URI symptoms, no n/v/d, tolerating po well, good uop, nl activity       Review of Systems   ROS:  Constitutional: negative except HPI  Skin: negative except HPI  Eye: negative except HPI  ENT: negative except HPI  Resp: negative except HPI  CV: negative except HPI  GI: negative except HPI  : negative except HPI  Musculosk: negative except HPI  Neuro: negative except HPI  Heme: negative except HPI  All/Immun: negative except HPI      Health Status   Allergies: No known allergies.   Immunizations: Up to date.     Past Medical/ Family/ Social History   Medical history:    All Problems  Downs syndrome / [id number] / Confirmed  Hirschsprung's disease / 254908096 / Confirmed  Colostomy / 5566267451 / Confirmed  Trisomy 21 / 73361567 / Confirmed  VSD (ventricular septal defect) / 67750448 / Confirmed  Hirschsprung's disease / 862605603 / Confirmed  Colostomy in place / 994805593 / Confirmed  Premature / 5833012 / Confirmed  VSD - Ventricular septal defect / [id number] / Confirmed  Disturbance in speech /  House Dust Mite Allergy        The house dust mite is an arachnid about 0.3 mm in size and not visible to the naked eye. There are around 150 species of house dust mites in the world. One mite produces up to 40 fecal droppings a day. One teaspoonful of bedroom dust contains an average of nearly 1000 mites and 250,000 minute droppings.    Causes and triggers of house dust mite allergy  The house dust mite requires a warm, moist environment without light in order to live and reproduce. Our beds are ideal. The mite feeds on human and animal skin scales. The allergen is mainly contained in the mite's feces. The feces contain allergy-triggering constituents which are spread in fine dust, are breathed in and can cause an allergic reaction.    Symptoms  When the allergens come into contact with the mucous membranes in the eyes, nose, mouth and throat, sufferers develop symptoms typical of an allergic cold (allergic rhinitis) or an allergic inflammation of the conjunctiva (allergic conjunctivitis): blocked or runny nose, sneezing, red, itchy eyes. If all of these symptoms are present, then the condition is also known as rhinoconjunctivitis. Often, the upper respiratory tract becomes chronically inflamed, primarily because house dust mites are present all year round.  The symptoms of house dust mite allergy typically occur in the morning and are more frequent in the cold months of the year.    Therapy and treatment  As a first step, mattress, pillows and duvet/comforter should be placed in mite-proof or anti-mite covers, sometimes known as encasings. Alternatively you can use pillows or comforter that can be washed at over 130 F monthly. At the same time, house dust should be minimized. If necessary, the symptoms can be treated with medication, for example antihistamines in the form of nasal sprays, eye drops and tablets. Desensitization/specific immunotherapy (SIT) is recommended for house dust allergy if all the measures  "above are not sufficient.    Tips and tricks:    Keep room temperature at 66-70 F and relative air humidity at a maximum of 50%.    Ideally, thoroughly air your home two to three times a day for 5 to 10 minutes each time.    Wash bed linens in at least 130 F every week.    Remove stuffed animals or freeze them every other week.    Keep ceiling fans off in the bedroom as they can stir up dust mite allergens.    Remove dust from furniture with a damp cloth and regularly wet mop floors.    Do not put pot and hydroponic plants in the bedroom and also avoid putting too many in living areas, as they increase room humidity.    When staying overnight in other accommodations, we recommend taking your own bed linen and the above anti-mite mattress covers with you.    Remove upholstered furniture from the bedroom and consider removing the carpets. Ideally, use sealed parquet or laminate rakesh, cork tiles or rakesh made of wood, novilon or PVC.    Maybe additionally reduce dust mites in mattress, upholstery, or rakesh using hot steam .      Modified from \"House Dust Mite Allergy\" by [employer] [place].    Mold Allergy    Molds are thread-like micro-fungi - they occur all over the world and grow particularly in places where there is moisture. If living spaces are infested with mold, it must be removed quickly. It may pose a health risk.    Triggers of mold allergy   The most important known allergens among fungi are Aspergillus species, Alternaria alternata, Cladosporium species and Penicillium species, which all belong to the category of molds. Molds are found all over the world, but predominantly in nature, mainly in the soil, in dead organic matter. Indoors molds develop in places where it is damp, such as areas affected by leaks, in ester cracks or when the relative air humidity is high. Poorly maintained ventilation systems, air humidifiers, aquariums, or decorative fountains and a lot of plants in a " 78632469 / Confirmed  Impaired cognition / 0046160736 / Confirmed  Gross motor development delay / 7361318173 / Confirmed  Chronic pain / 406328841 / Confirmed  Premature delivery / 842756845 / Confirmed  H/O: blood transfusion / 697983758 / Confirmed  Failure to thrive (0-17) / 9091860435 / Confirmed.   Surgical history: Negative.   Family history: Not significant.   Social history: Negative.     Physical Examination              Vital Signs   Vital Sign   04/27/20 14:49 CDT Temperature - VS 37.4 deg_C  Normal    Temperature Source - VS Tympanic    Heart/Pulse Rate 91  Normal    Pulse Source Monitor    Respiration Rate 40 breaths/min  HI    SpO2 99 %  Normal    NIBP Systolic 97  Normal    NIBP Diastolic 74  HI    NIBP Source Left Arm    NIBP MAP 82  HI   .   Gen: alert, NAD  HEENT: MMM, down faces  CV: RRR, no murmur, cap refill < 2 sec  Pulm: CTA bilat, no retractions  Abd: soft but significant distension, unable to appreciate any mass but limited exam due to distension  Ext: no c/c/e  Neuro: non focal   Skin: no rashes or lesions noted     Medical Decision Making   Rational: 2 y/o female with a history of VSD with spontaneous closure, Down syndrome and Hirschsprung's disease sp ostomy and ostomy take down as well as G tube here with abdominal distension over the past 3 weeks - no concern for acute abdomen at this time.  Will get G tube study as well as abd series to evaluate for obstruction, free air and G tube placement due to recent parent repleacement of G tube.  Will discuss case with surgery as had  h/o theresa and has not had fu bowel regimen       Reexamination/ Reevaluation   Notes:   Result title:  XR ABDOMEN OBSTRUCTIVE SERIES 3V/XR GASTROSTOMY TUBE CHECK  Result status:  Final  Verified by:  ANALISA ALEJANDRA on 04/27/2020 16:36  IMPRESSION:Distal obstruction appears attributable to rectal impaction with dense stool.Metallic discoid foramen body in the rectum favors a button battery.Findings were  "room can also lead to a mold infestation.    Symptoms   Mold allergy, like other respiratory allergies, is manifest as allergic runny nose, streaming eyes, cough and shortness of breath and is frequently accompanied by asthma. As well as allergic reactions, molds also cause irritation of the airways, the mucous membranes of the eyes and the skin. The growth of mold is often associated with a distinctively musty smell of earth, damp and mushrooms, which additionally impairs well-being.    Therapy and treatment   Any fungal infestation in living areas must be removed rapidly and properly. It is always important to get rid of the cause, hence eradicate the damp problem. Otherwise the mold will quickly reappear. People with health problems should not remove even small patches of mold growth. Before the mold is cleared and in the weeks following its clearance, rooms should also be aired frequently and dust should be removed.    Tips and tricks:     Keep relative air humidity to a maximum of 45 per cent in winter    Keep temperature between 66 and 73 C in winter    Air rooms thoroughly for five to ten minutes twice to three times a day    Position furniture at least ten centimetres away from walls    No plants in the bedroom area    Dispose of compost or organic waste on a daily basis or store temporarily outside the home    Do not use air humidifiers or only use them if air humidity is continuously monitored        Modified from \"Mould Allergy\" by [employer] [place].      " discussed with Dr. Sissy Jenkins by Spring Weir MD.  Time: 04/27/20 17:44:00 .   Notes: saline enema completed - no solid stool.  about 350 ml saline put in and 300 ml returned, no fb retrieved.  Will repeat KUB, repeat abd circumference and admit for colon/rectal clean out with likely go brisa  abd circumference 52cm to 46cm after enema.     Impression and Plan   Diagnosis   Complaint of Abdominal distention (PNED [id number], Reason For Visit, Emergency medicine, Medical)  Impacted stool in rectum ([id number] K56.41, Discharge, Medical)   Plan   Condition: Stable.    Disposition: Admit to Inpatient Unit.    Counseled: Family, Regarding diagnosis, Regarding treatment plan.   Notes

## 2021-12-03 ENCOUNTER — OFFICE VISIT (OUTPATIENT)
Dept: ALLERGY | Facility: CLINIC | Age: 21
End: 2021-12-03
Payer: COMMERCIAL

## 2021-12-03 ENCOUNTER — TELEPHONE (OUTPATIENT)
Dept: DERMATOLOGY | Facility: CLINIC | Age: 21
End: 2021-12-03

## 2021-12-03 DIAGNOSIS — L23.89 ALLERGIC CONTACT DERMATITIS DUE TO OTHER AGENTS: ICD-10-CM

## 2021-12-03 DIAGNOSIS — L20.89 OTHER ATOPIC DERMATITIS: Primary | ICD-10-CM

## 2021-12-03 PROCEDURE — 99214 OFFICE O/P EST MOD 30 MIN: CPT | Performed by: DERMATOLOGY

## 2021-12-03 ASSESSMENT — PAIN SCALES - GENERAL: PAINLEVEL: NO PAIN (0)
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
## 2021-12-03 NOTE — TELEPHONE ENCOUNTER
Prior Authorization Retail Medication Request    Medication/Dose: Dupilumab 300 MG/2ML SOPN  ICD code (if different than what is on RX):  Intrinsic atopic dermatitis [L20.84]  Previously Tried and Failed:    Rationale:      Insurance Name:  [employer]  Insurance ID:  [id number]    Pharmacy Information (if different than what is on RX)  Name:  [facility] - [place], [place] - [facility] [address]  Phone: [phone]

## 2021-12-03 NOTE — NURSING NOTE
Dermatology Rooming Note    Crys Steel's goals for this visit include:   Chief Complaint   Patient presents with     MARKUS Barboza is here today for day 5 patch testing      Orlando Gruber CMA

## 2021-12-03 NOTE — PATIENT INSTRUCTIONS
Patient information given   [x] ACDS CAMP information's (# [id number], and [id number]) to following compounds:   Methyldibromoglutaronitrile, Potassium dichromate, Propolis, fragrance mix/Cinnamic  alcohol and Benzoylperoxide     [x] General information's to following compounds: HDM and molds info given

## 2021-12-06 ENCOUNTER — TELEPHONE (OUTPATIENT)
Dept: DERMATOLOGY | Facility: CLINIC | Age: 21
End: 2021-12-06
Payer: COMMERCIAL

## 2021-12-06 NOTE — TELEPHONE ENCOUNTER
----- Message from Sandi Olson MD sent at 12/4/2021  1:22 PM CST -----  Regarding: RE: patient with atopic dermatitis and contact allergies  Thank you so much Dr. Figueroa!    Christa, can you please make sure this patient is scheduled with me in early March.      Thanks!  Sandi     ----- Message -----  From: Osorio Figueroa MD  Sent: 12/3/2021   2:32 PM CST  To: Sandi Olson MD, #  Subject: patient with atopic dermatitis and contact a#    Dear Dr. Olson    I tested Tamra and she has next to her strong atopic dermatitis some contact sensitization. She will get her CAMP Loki and I would propose that you see her beginning of March to discuss how to advance with the Dupixent injections.     Kind regards    Osorio Figueroa

## 2021-12-06 NOTE — TELEPHONE ENCOUNTER
Attempted to reach, phone continued to ring and went to busy signal. Patient already scheduled for 4/21

## 2021-12-07 NOTE — TELEPHONE ENCOUNTER
PA Initiation    Medication: Dupilumab 300 MG/2ML SOPN  Insurance Company: [employer] - Phone [phone] Fax [phone]  Pharmacy Filling the Rx: [facility] - [place], [place] - [facility] [address]  Filling Pharmacy Phone:    Filling Pharmacy Fax:    Start Date: 12/7/2021    Central Prior Authorization Team   Phone: [phone]

## 2021-12-09 NOTE — TELEPHONE ENCOUNTER
Prior Authorization Not Needed per Insurance    [facility]: [facility]  Insurance Company: [employer] - Phone [phone] Fax [phone]  Pharmacy Filling the Rx: [facility] - [facility] [address]  Renewal request, but per response no auth needed, will await and see if or when pharmacy gets a rejection to pursue auth at that time.

## 2021-12-09 NOTE — TELEPHONE ENCOUNTER
[facility] Prior Authorization Team   Phone: [phone]  Fax: [phone]    PA Initiation    [facility]  Insurance Company: [employer] - Phone [phone] Fax [phone]  Pharmacy Filling the Rx: [facility] - [place], [place] - [facility] [address]  Filling Pharmacy Phone: [phone]  Filling Pharmacy Fax: [phone]  Start Date: 12/7/2021    Faxed manual PA form to [employer] for renewal.

## 2021-12-10 NOTE — TELEPHONE ENCOUNTER
[facility] Prior Authorization Team   Phone: [phone]  Fax: [phone]    Prior Authorization Renewal Approval    Authorization Effective Date: 11/7/2021  Authorization Expiration Date: 12/10/2022  Medication: Dupilumab 300 MG/2ML SOPN  Approved Dose/Quantity: 4  Reference #: [id number]   Insurance Company: [employer] - Phone [phone] Fax [phone]  Expected CoPay: 3     CoPay Card Available:      Foundation Assistance Needed:    Which Pharmacy is filling the prescription (Not needed for infusion/clinic administered): [facility] - [facility] [address]  Pharmacy Notified: Yes  Patient Notified:         Detail Level: Simple Additional Notes: Patient consent was obtained to proceed with the visit and recommended plan of care after discussion of all risks and benefits, including the risks of COVID-19 exposure. Additional Notes: Continuing minoxidil , may increase to 5% after 2m if no results Additional Notes: Pt awesome of scalp care
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
## 2022-01-12 ENCOUNTER — VIRTUAL VISIT (OUTPATIENT)
Dept: ALLERGY | Facility: CLINIC | Age: 22
End: 2022-01-12
Payer: COMMERCIAL

## 2022-01-12 DIAGNOSIS — L23.89 ALLERGIC CONTACT DERMATITIS DUE TO OTHER AGENTS: ICD-10-CM

## 2022-01-12 DIAGNOSIS — L20.89 OTHER ATOPIC DERMATITIS: Primary | ICD-10-CM

## 2022-01-12 PROCEDURE — 99214 OFFICE O/P EST MOD 30 MIN: CPT | Mod: 95 | Performed by: DERMATOLOGY

## 2022-01-12 RX ORDER — TACROLIMUS 0.3 MG/G
OINTMENT TOPICAL 2 TIMES DAILY
Qty: 60 G | Refills: 1 | Status: SHIPPED | OUTPATIENT
Start: 2022-01-12

## 2022-01-12 RX ORDER — TRIAMCINOLONE ACETONIDE 0.25 MG/G
OINTMENT TOPICAL DAILY PRN
Qty: 15 G | Refills: 3 | Status: SHIPPED | OUTPATIENT
Start: 2022-01-12

## 2022-01-12 NOTE — NURSING NOTE
Chief Complaint   Patient presents with     Allergy Recheck     Tamra is having problems with her make up that she started using off of the CAMP PARESH recommendation. She said her skin turned red and it was hot to the touch.      Alejandro Henderson, Paramedic
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
## 2022-01-17 ENCOUNTER — TELEPHONE (OUTPATIENT)
Dept: DERMATOLOGY | Facility: CLINIC | Age: 22
End: 2022-01-17
Payer: COMMERCIAL

## 2022-01-17 NOTE — TELEPHONE ENCOUNTER
Prior Authorization Not Needed per Insurance    Medication: tacrolimus (PROTOPIC) 0.03 % external ointment--NO PA NEEDED  Insurance Company: [employer] - Phone [phone] Fax [phone]  Expected CoPay:      Pharmacy Filling the Rx: [id number] - [place], [place] - [address]  [address]  Pharmacy Notified: Yes  Patient Notified: Yes **Instructed pharmacy to notify patient when script is ready to /ship.**    Insurance plan prefers brand name.

## 2022-01-17 NOTE — TELEPHONE ENCOUNTER
Prior Authorization Retail Medication Request    Medication/Dose: tacrolimus (PROTOPIC) 0.03 % external ointment  ICD code (if different than what is on RX):  Other atopic dermatitis [L20.89]  Allergic contact dermatitis due to other agents [L23.89]   Previously Tried and Failed:    Rationale:      Insurance Name:  [facility]  Insurance ID:  [id number]

## 2022-03-08 ENCOUNTER — TELEPHONE (OUTPATIENT)
Dept: DERMATOLOGY | Facility: CLINIC | Age: 22
End: 2022-03-08

## 2022-03-08 NOTE — TELEPHONE ENCOUNTER
I spoke with Crys today for a Specialty Pharmacy Therapy Management follow up call. She informed me that she has decided to stop Dupixent. Her last dose was given on 12/4/21. She stated she was having red, itchy eyes and had a lot of anxiety around giving the doses by herself. For a while she was bringing her dose to her allergy appt and the nurse was kindly administering for her. She states since stopping, her skin is doing really well. She cont to use aquaphor and topicals.     If you have any questions or concerns, please feel free to reach out to myself or the patient. I can be reached at [phone].    Thank you for your time,    Juani Rojas, Pharm.D.[facility]  [address] [place] [address]  [email or web address]  Office: [phone]

## 2022-04-21 ENCOUNTER — VIRTUAL VISIT (OUTPATIENT)
Dept: DERMATOLOGY | Facility: CLINIC | Age: 22
End: 2022-04-21
Payer: COMMERCIAL

## 2022-04-21 DIAGNOSIS — L20.84 INTRINSIC ATOPIC DERMATITIS: Primary | ICD-10-CM

## 2022-04-21 PROCEDURE — 99213 OFFICE O/P EST LOW 20 MIN: CPT | Mod: 95

## 2022-04-21 NOTE — NURSING NOTE
Dermatology Rooming Note    Crys Steel's goals for this visit include:   Chief Complaint   Patient presents with     Derm Problem     Dermatitis - doing well, not on Dupixent     Christa Molina, CMA

## 2022-04-21 NOTE — LETTER
Date:April 22, 2022      Patient was self referred, no letter generated. Do not send.        [facility] Health Information

## 2022-04-21 NOTE — PATIENT INSTRUCTIONS
- Start using the triamcinolone 0.025% ointment on the face rash and then apply aquaphor to the enitre face   - Please review the below information on narrowband UVB therapy for atopic dermatitis, methotrexate, and upadacitinib.  Please let us know which course you would like to pursue.      Phototherapy for atopic dermatitis:     Phototherapy, also called light therapy, means treatment with different wavelengths of ultraviolet (UV) light. It can be prescribed to treat many forms of eczema in adults and children and helps to reduce itch and inflammation.    Phototherapy is generally used for eczema that is all over the body (widespread) or for localized eczema (such as hands and feet) that has not improved with topical treatments. The most common type of phototherapy used to treat eczema is narrowband ultraviolet B (NB-UVB) light, although other options may be recommended by your healthcare provider, including those that use ultraviolet A (UVA) light. Treatment with phototherapy uses a special machine to emit either UVB or UVA light.    What should I expect with phototherapy?  During your visit, you will apply a moisturizing oil to the skin and  a large cabinet undressed except for underwear and goggles to protect the eyes. The light-emitting machine will be activated for a short time - usually just seconds to minutes - and it will either treat the entire body or just certain exposed areas. It may take one or two months of steady treatment with phototherapy to start to see improvement in eczema symptoms, and at that point, the frequency of the visits can sometimes be reduced or stopped for a period to see if the eczema is in remission.  Potential side effects of phototherapy include:  Sunburn and skin tenderness (common)  Premature skin aging (common)  Photosensitive skin eruptions  Nonmelanoma skin cancer  Cataracts (from UVA treatment)      Methotrexate     What is it?   Methotrexate is in a class of  medications known as antimetabolites. It was approved by the U.S. Food and Drug Administration in the 1970s for use in patients with psoriasis. It is usually sold as a generic (without a brand name).   How does it work?     Methotrexate binds to and inhibits an enzyme involved in the rapid growth of cells. In individuals with psoriasis, the drug slows down the rate of skin cell growth, and suppresses symptoms of inflammation in the joints for people with psoriatic arthritis.     Who can take it?   Side effects   The most common associated with methotrexate are:   ? Nausea   ? Tiredness, difficulty sleeping   ? Lightheadedness   ? Mouth ulcers   ? Vomiting   ? Headache   ? Fever, chills   ? Hair loss     Methotrexate is indicated for use in adults with severe psoriasis and/or psoriatic arthritis.     Who should not take it?   ? People with alcoholism, alcoholic liver disease or other   chronic liver diseases such as cirrhosis   ? People with immunodeficiency syndromes   ? Pregnant or nursing mothers   ? Men or women attempting a pregnancy   ? People with active peptic ulcers   ? People with significant liver or kidney abnormalities   ? People with active infectious disease   ? People with pre-existing blood problems such as underdevelopment of bone marrow, low white blood cell count, low platelets or significant anemia     How is it used?   Methotrexate is taken once a week, either by mouth (pill or liquid) or by injection. The drug can be taken in a single dose or three doses taken at 12-hour intervals over a period of 24 hours. If doing well, a person may be taken off methotrexate until symptoms return. However, some individuals must continue a maintenance dose to stay clear.   Can it be used with other treatments?     Methotrexate can be used with PUVA (the light-sensitizing drug psoralen with ultraviolet light A or ultraviolet light B. In some cases, Soriatane (acitretin), cyclosporine or some biologics have been  used with methotrexate. Talk with your health care provider about any other medications, vitamins or supplements you may be taking to prevent potential drug interactions.     Effectiveness   Improvement from methotrexate usually begins within three to six weeks of starting this drug. Individuals may not see maximal improvement for up to six months.     Risks   The main risk of long-term methotrexate treatment is the risk of liver damage. An estimated one out of 200 people will develop reversible liver scarring. Some patients can develop irreversible cirrhosis. The risk of liver damage can increase if a person drinks alcohol, has abnormal kidney function, is obese, has diabetes or has had prior liver disease.   Individuals taking methotrexate need to have regular blood tests. This is to ensure the drug is being safely processed by the body. This is also to make sure the liver, blood or bone marrow is not negatively affected. Methotrexate can cause a reduced white blood cell count. This can make a person more at risk for infection.     Pregnancy should be avoided if either partner is taking methotrexate. Men should be off methotrexate for at least three months before a couple tries to conceive. Women should wait at least four months after stopping methotrexate to become pregnant.     Side effects are generally manageable with careful monitoring and education. However, severe nausea or sores in the mouth could mean that the dose is too high. Rarely, some side effects may occur years after the drug is used. This includes certain types of cancer, such as lymphoma, and bone marrow toxicity.     Folic acid supplements can decrease the side effects of methotrexate during treatment. However, folic acid should not be taken on the same days that methotrexate is taken. There is evidence that taking folic acid, even if only on days between methotrexate doses, can reduce the effectiveness of methotrexate. Talk with your health  care provider about the best schedule for taking folic acid supplements.     Potential Interactions can occur when taking methotrexate. Your health care provider should always be informed of any other medications, therapies or dietary supplements you are using. Medications for inflammation or pain (including aspirin and ibuprofen) may increase the effects of methotrexate, which could be harmful. Some oral antibiotics can interfere with the absorption of methotrexate. Penicillin can make it more difficult for the kidneys to clear the drug out of the body. Sulfa drugs, especially those containing trimethoprim (brand names Septra or Bactrim), should not be taken while on methotrexate due to a potentially fatal interaction. Talk with your health care provider before taking any of these drugs with methotrexate.   Individuals are advised not to drink alcohol while on a course of methotrexate because it increases the chance of liver damage. On rare occasions, sensitivity to light can occur even when methotrexate is taken several days after exposure to ultraviolet light. This is called a  sunburn recall.      For detailed information on side effects and safety, talk with your doctor   Financial assistance information   Methotrexate Tablets 2.5mg methotrexate injection 25mg/ml (Rheumatrex)   Rx Outreach Medications methotrexate tablets 2.5mg (Rheumatrex)   [phone] [employer] Patient Assistance Program, [phone]   www.[employer] www.[employer]     [address]   [place] [address]   [phone]   education@psoriasis.org   www.psoriasis.org   National Psoriasis Foundation educational materials are medically reviewed and are not intended to replace the  of a physician. The Psoriasis Foundation does not endorse any medications, products or treatments for psoriasis or psoriatic arthritis and advises you to consult a physician before initiating any treatment.     National Psoriasis Foundation August  2015 - BROOK      Upadacitinib        About upadacitinib   Upadacitinib is an oral selective and reversible LORA inhibitor. In human cellular assays, upadacitinib preferentially inhibits signalling by JAK1 or JAK1/3 with functional selectivity over cytokine receptors that signal via pairs of JAK2.

## 2022-04-21 NOTE — PROGRESS NOTES
[facility] Dermatology Note  Encounter Date: Apr 21, 2022  Telederm visit 9:15-9:30      Dermatology Problem List:  # Intrinsic Atopic dermatitis  - face TAC 0.025%, body TAC 0.1%, mupirocin for impetiginized areas on face, gentle skin cares  - previous: failed protopic, dupixent (started 8/2021: patient stopped 12/2021 because she did not want to inject herself)  ____________________________________________    Assessment & Plan:   # Diffuse atopic dermatitis with impetiginization.   Had significantly improved in October after starting dupixent, however patient stopped this medication in December and does not want to go back to doing injections.  She is currently flaring significantly.  She was patch tested by Dr. Figueroa found to have significant contact sensitization to potassium dichromate, methyl dibromo glutaronitrile, propolis, and irritant reactions to fragrance mix/cinnamic alcohol and benzoyl peroxide. She also has a positive atopy screen and had clear immediate type reactions to house dust mites and molds.  We discussed alternative systemic options for this patient including methotrexate, upadacitinib, and nbUVB.  Handouts were provided for the patient and she will get back to us regarding which course she would like to proceed with.  Will need baseline labs for either systemic.  For current facial flare, we continue to recommend avoidance of allergens, makeup.  Given that protopic causes the most burning, I suggest she start applyhing the triamcinolone 0.025% ointment BID with aquaphor over top.  We will schedule follow up in 4 weeks.    - gentle skin care education   - continue TAC 0.1% on body twice daily   - continue TAC 0.025% BID for face cover with aquaphor   - provided information on methotrexate, upadacitinib and nbUVB     Procedures Performed:   none    Follow-up: 1 months in person     Staff & resident:     Sandi Olson MD     The patient was seen and staffed with  Dr. Mac MD     Staff Physician Comments:   I evaluated any available patient photographs with the resident and I edited the assessment and plan as documented in the note. I was present on the line and participated in the entire telephone call.    Rocky Watts MD   of Dermatology  Department of Dermatology  [facility]    ____________________________________________    CC: Derm Problem (Dermatitis - doing well, not on Dupixent)      HPI:  Ms. Crys Steel is a 21 year old female who presents today as a return patient for atopic dermatitis.  She was started on dupixent in August 2021 but discontinued in in December 2021 due to dislike of the injections and fear of being on this medication in the long-term.      She was seen by Dr. Figueroa for allergy testing which was positive to potassium dichromate, methyl dibromo glutaronitrile, propolis, and irritant reactions to fragrance mix/cinnamic alcohol and benzoyl peroxide. She also has a positive atopy screen and had clear immediate type reactions to house dust mites and molds.      She has significant atopic dermatitis flare on the face right now since stopping the dupixent.  The body is okay per patient.  She prefers not to do the injections herself and therefore does not want to go back to the dupisent.  She tried using the ointments TAC 0.025% and  protopic on the face but this burns and so she is only using aquaphor at this time.      Patient is otherwise feeling well, without additional concerns.    Labs:  none    Physical Exam:  Vitals: There were no vitals taken for this visit.  SKIN: Focused exam, face hands legs in teledermatology photos sent on 4/22/22, acceptable  - Chandra's skin type III  - eczematous dermatitis and hypopigmented patches on the face  eczematous patches   - No other lesions of concern on areas examined.     Medications:  Current Outpatient Medications   Medication      ALEJANDRO FE 1/20 1-20 MG-MCG tablet     mupirocin (BACTROBAN) 2 % external ointment     tacrolimus (PROTOPIC) 0.03 % external ointment     tacrolimus (PROTOPIC) 0.03 % external ointment     triamcinolone (KENALOG) 0.025 % external ointment     triamcinolone (KENALOG) 0.025 % external ointment     triamcinolone (KENALOG) 0.1 % external ointment     Dupilumab 300 MG/2ML SOPN     Dupilumab 300 MG/2ML SOPN     No current facility-administered medications for this visit.      Past Medical/Surgical History:   There is no problem list on file for this patient.    No past medical history on file.

## 2022-04-21 NOTE — LETTER
4/21/2022       RE: Crys Steel  [id number] [facility] [address]     Dear Colleague,    Thank you for referring your patient, Crys Steel, to the [facility] [place] at [facility]. Please see a copy of my visit note below.    [facility] Dermatology Note  Encounter Date: Apr 21, 2022  Telederm visit 9:15-9:30      Dermatology Problem List:  # Intrinsic Atopic dermatitis  - face TAC 0.025%, body TAC 0.1%, mupirocin for impetiginized areas on face, gentle skin cares  - previous: failed protopic, dupixent (started 8/2021: patient stopped 12/2021 because she did not want to inject herself)  ____________________________________________    Assessment & Plan:   # Diffuse atopic dermatitis with impetiginization.   Had significantly improved in October after starting dupixent, however patient stopped this medication in December and does not want to go back to doing injections.  She is currently flaring significantly.  She was patch tested by Dr. Figueroa found to have significant contact sensitization to potassium dichromate, methyl dibromo glutaronitrile, propolis, and irritant reactions to fragrance mix/cinnamic alcohol and benzoyl peroxide. She also has a positive atopy screen and had clear immediate type reactions to house dust mites and molds.  We discussed alternative systemic options for this patient including methotrexate, upadacitinib, and nbUVB.  Handouts were provided for the patient and she will get back to us regarding which course she would like to proceed with.  Will need baseline labs for either systemic.  For current facial flare, we continue to recommend avoidance of allergens, makeup.  Given that protopic causes the most burning, I suggest she start applyhing the triamcinolone 0.025% ointment BID with aquaphor over top.  We will schedule follow up in 4 weeks.    - gentle skin  care education   - continue TAC 0.1% on body twice daily   - continue TAC 0.025% BID for face cover with aquaphor   - provided information on methotrexate, upadacitinib and nbUVB     Procedures Performed:   none    Follow-up: 1 months in person     Staff & resident:     Sandi Olson MD     The patient was seen and staffed with Dr. Mac MD     Staff Physician Comments:   I evaluated any available patient photographs with the resident and I edited the assessment and plan as documented in the note. I was present on the line and participated in the entire telephone call.    Rocky Watts MD   of Dermatology  Department of Dermatology  [facility]    ____________________________________________    CC: Derm Problem (Dermatitis - doing well, not on Dupixent)      HPI:  Ms. Crys Steel is a 21 year old female who presents today as a return patient for atopic dermatitis.  She was started on dupixent in August 2021 but discontinued in in December 2021 due to dislike of the injections and fear of being on this medication in the long-term.      She was seen by Dr. Figueroa for allergy testing which was positive to potassium dichromate, methyl dibromo glutaronitrile, propolis, and irritant reactions to fragrance mix/cinnamic alcohol and benzoyl peroxide. She also has a positive atopy screen and had clear immediate type reactions to house dust mites and molds.      She has significant atopic dermatitis flare on the face right now since stopping the dupixent.  The body is okay per patient.  She prefers not to do the injections herself and therefore does not want to go back to the dupisent.  She tried using the ointments TAC 0.025% and  protopic on the face but this burns and so she is only using aquaphor at this time.      Patient is otherwise feeling well, without additional concerns.    Labs:  none    Physical Exam:  Vitals: There were no vitals  taken for this visit.  SKIN: Focused exam, face hands legs in teledermatology photos sent on 4/22/22, acceptable  - Chandra's skin type III  - eczematous dermatitis and hypopigmented patches on the face  eczematous patches   - No other lesions of concern on areas examined.     Medications:  Current Outpatient Medications   Medication     ALEJANDRO FE 1/20 1-20 MG-MCG tablet     mupirocin (BACTROBAN) 2 % external ointment     tacrolimus (PROTOPIC) 0.03 % external ointment     tacrolimus (PROTOPIC) 0.03 % external ointment     triamcinolone (KENALOG) 0.025 % external ointment     triamcinolone (KENALOG) 0.025 % external ointment     triamcinolone (KENALOG) 0.1 % external ointment     Dupilumab 300 MG/2ML SOPN     Dupilumab 300 MG/2ML SOPN     No current facility-administered medications for this visit.      Past Medical/Surgical History:   There is no problem list on file for this patient.    No past medical history on file.                   Again, thank you for allowing me to participate in the care of your patient.      Sincerely,    Sandi Olson MD

## 2022-05-02 ENCOUNTER — TELEPHONE (OUTPATIENT)
Dept: DERMATOLOGY | Facility: CLINIC | Age: 22
End: 2022-05-02
Payer: COMMERCIAL

## 2022-05-16 ENCOUNTER — PATIENT OUTREACH (OUTPATIENT)
Dept: DERMATOLOGY | Facility: CLINIC | Age: 22
End: 2022-05-16
Payer: COMMERCIAL

## 2022-05-16 NOTE — CONFIDENTIAL NOTE
Attempted to reach patient to schedule follow up in the Dermatology Clinic.  No answer,  LM on VM to call office and [employer] message sent.    Schedule with Dr. Rocky Watts or next available provider for in-person visit.

## 2022-06-30 ENCOUNTER — LAB (OUTPATIENT)
Dept: LAB | Facility: CLINIC | Age: 22
End: 2022-06-30
Payer: COMMERCIAL

## 2022-06-30 DIAGNOSIS — L20.84 INTRINSIC ATOPIC DERMATITIS: Primary | ICD-10-CM

## 2022-06-30 DIAGNOSIS — L20.84 INTRINSIC ATOPIC DERMATITIS: ICD-10-CM

## 2022-06-30 LAB
ALBUMIN SERPL-MCNC: 4.3 G/DL (ref 3.4–5)
ALP SERPL-CCNC: 68 U/L (ref 40–150)
ALT SERPL W P-5'-P-CCNC: 31 U/L (ref 0–50)
ANION GAP SERPL CALCULATED.3IONS-SCNC: 8 MMOL/L (ref 3–14)
AST SERPL W P-5'-P-CCNC: 26 U/L (ref 0–45)
BASOPHILS # BLD AUTO: 0.1 10E3/UL (ref 0–0.2)
BASOPHILS NFR BLD AUTO: 1 %
BILIRUB DIRECT SERPL-MCNC: 0.1 MG/DL (ref 0–0.2)
BILIRUB SERPL-MCNC: 0.4 MG/DL (ref 0.2–1.3)
BUN SERPL-MCNC: 11 MG/DL (ref 7–30)
CALCIUM SERPL-MCNC: 9.5 MG/DL (ref 8.5–10.1)
CHLORIDE BLD-SCNC: 105 MMOL/L (ref 94–109)
CHOLEST SERPL-MCNC: 187 MG/DL
CO2 SERPL-SCNC: 27 MMOL/L (ref 20–32)
CREAT SERPL-MCNC: 0.62 MG/DL (ref 0.52–1.04)
EOSINOPHIL # BLD AUTO: 0.6 10E3/UL (ref 0–0.7)
EOSINOPHIL NFR BLD AUTO: 11 %
ERYTHROCYTE [DISTWIDTH] IN BLOOD BY AUTOMATED COUNT: 12.3 % (ref 10–15)
FASTING STATUS PATIENT QL REPORTED: YES
GFR SERPL CREATININE-BSD FRML MDRD: >90 ML/MIN/1.73M2
GLUCOSE BLD-MCNC: 82 MG/DL (ref 70–99)
HBV CORE AB SERPL QL IA: NONREACTIVE
HBV SURFACE AB SERPL IA-ACNC: 0 M[IU]/ML
HBV SURFACE AG SERPL QL IA: NONREACTIVE
HCG SERPL QL: NEGATIVE
HCT VFR BLD AUTO: 44.1 % (ref 35–47)
HCV AB SERPL QL IA: NONREACTIVE
HDLC SERPL-MCNC: 79 MG/DL
HGB BLD-MCNC: 14.5 G/DL (ref 11.7–15.7)
HIV 1+2 AB+HIV1 P24 AG SERPL QL IA: NONREACTIVE
IMM GRANULOCYTES # BLD: 0 10E3/UL
IMM GRANULOCYTES NFR BLD: 0 %
LDLC SERPL CALC-MCNC: 97 MG/DL
LYMPHOCYTES # BLD AUTO: 1.9 10E3/UL (ref 0.8–5.3)
LYMPHOCYTES NFR BLD AUTO: 37 %
MCH RBC QN AUTO: 29.8 PG (ref 26.5–33)
MCHC RBC AUTO-ENTMCNC: 32.9 G/DL (ref 31.5–36.5)
MCV RBC AUTO: 91 FL (ref 78–100)
MONOCYTES # BLD AUTO: 0.3 10E3/UL (ref 0–1.3)
MONOCYTES NFR BLD AUTO: 6 %
NEUTROPHILS # BLD AUTO: 2.2 10E3/UL (ref 1.6–8.3)
NEUTROPHILS NFR BLD AUTO: 45 %
NONHDLC SERPL-MCNC: 108 MG/DL
NRBC # BLD AUTO: 0 10E3/UL
NRBC BLD AUTO-RTO: 0 /100
PLATELET # BLD AUTO: 263 10E3/UL (ref 150–450)
POTASSIUM BLD-SCNC: 4 MMOL/L (ref 3.4–5.3)
PROT SERPL-MCNC: 8.1 G/DL (ref 6.8–8.8)
RBC # BLD AUTO: 4.87 10E6/UL (ref 3.8–5.2)
SODIUM SERPL-SCNC: 140 MMOL/L (ref 133–144)
TRIGL SERPL-MCNC: 55 MG/DL
WBC # BLD AUTO: 5.1 10E3/UL (ref 4–11)

## 2022-06-30 PROCEDURE — 86481 TB AG RESPONSE T-CELL SUSP: CPT | Mod: 90 | Performed by: PATHOLOGY

## 2022-06-30 PROCEDURE — 99000 SPECIMEN HANDLING OFFICE-LAB: CPT | Performed by: PATHOLOGY

## 2022-06-30 PROCEDURE — 85025 COMPLETE CBC W/AUTO DIFF WBC: CPT | Performed by: PATHOLOGY

## 2022-06-30 PROCEDURE — 36415 COLL VENOUS BLD VENIPUNCTURE: CPT | Performed by: PATHOLOGY

## 2022-06-30 PROCEDURE — 86704 HEP B CORE ANTIBODY TOTAL: CPT | Mod: 90 | Performed by: PATHOLOGY

## 2022-06-30 PROCEDURE — 86803 HEPATITIS C AB TEST: CPT | Mod: 90 | Performed by: PATHOLOGY

## 2022-06-30 PROCEDURE — 80053 COMPREHEN METABOLIC PANEL: CPT | Performed by: PATHOLOGY

## 2022-06-30 PROCEDURE — 82248 BILIRUBIN DIRECT: CPT | Performed by: PATHOLOGY

## 2022-06-30 PROCEDURE — 87340 HEPATITIS B SURFACE AG IA: CPT | Mod: 90 | Performed by: PATHOLOGY

## 2022-06-30 PROCEDURE — 87389 HIV-1 AG W/HIV-1&-2 AB AG IA: CPT | Mod: 90 | Performed by: PATHOLOGY

## 2022-06-30 PROCEDURE — 86706 HEP B SURFACE ANTIBODY: CPT | Mod: 90 | Performed by: PATHOLOGY

## 2022-06-30 PROCEDURE — 80061 LIPID PANEL: CPT | Performed by: PATHOLOGY

## 2022-06-30 PROCEDURE — 84703 CHORIONIC GONADOTROPIN ASSAY: CPT | Performed by: PATHOLOGY

## 2022-07-01 LAB
GAMMA INTERFERON BACKGROUND BLD IA-ACNC: 0.2 IU/ML
M TB IFN-G BLD-IMP: NEGATIVE
M TB IFN-G CD4+ BCKGRND COR BLD-ACNC: 9.8 IU/ML
MITOGEN IGNF BCKGRD COR BLD-ACNC: -0.01 IU/ML
MITOGEN IGNF BCKGRD COR BLD-ACNC: -0.03 IU/ML
QUANTIFERON MITOGEN: 10 IU/ML
QUANTIFERON NIL TUBE: 0.2 IU/ML
QUANTIFERON TB1 TUBE: 0.17 IU/ML
QUANTIFERON TB2 TUBE: 0.19
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
## 2022-08-18 ENCOUNTER — OFFICE VISIT (OUTPATIENT)
Dept: DERMATOLOGY | Facility: CLINIC | Age: 22
End: 2022-08-18
Payer: COMMERCIAL

## 2022-08-18 DIAGNOSIS — L20.89 OTHER ATOPIC DERMATITIS: ICD-10-CM

## 2022-08-18 DIAGNOSIS — L20.84 INTRINSIC ATOPIC DERMATITIS: ICD-10-CM

## 2022-08-18 PROCEDURE — 99214 OFFICE O/P EST MOD 30 MIN: CPT | Mod: GC | Performed by: STUDENT IN AN ORGANIZED HEALTH CARE EDUCATION/TRAINING PROGRAM

## 2022-08-18 RX ORDER — TRIAMCINOLONE ACETONIDE 0.25 MG/G
OINTMENT TOPICAL 2 TIMES DAILY
Qty: 80 G | Refills: 11 | Status: SHIPPED | OUTPATIENT
Start: 2022-08-18

## 2022-08-18 RX ORDER — TRIAMCINOLONE ACETONIDE 1 MG/G
OINTMENT TOPICAL
Qty: 453 G | Refills: 3 | Status: SHIPPED | OUTPATIENT
Start: 2022-08-18

## 2022-08-18 ASSESSMENT — PAIN SCALES - GENERAL: PAINLEVEL: NO PAIN (0)

## 2022-08-18 NOTE — LETTER
Date:August 19, 2022      Provider requested that no letter be sent. Do not send.       [facility]

## 2022-08-18 NOTE — PATIENT INSTRUCTIONS
Dermatology Bleach Bath instructions    Type: Regular bleach used for clothing    1/2 cup plain bleach for a full tub 2- 3 times weekly, followed by triamcinolone 0.01% ointment and liberal application of a thick moisturizer such as Aquaphor.     Continue to use triamcinolone 0.025% ointment to the face.    Start phototherapy. Our clinic will reach out to you regarding this.
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
## 2022-08-18 NOTE — NURSING NOTE
Dermatology Rooming Note    Crys Steel's goals for this visit include:   Chief Complaint   Patient presents with     Derm Problem     Tamra is here today a dermatitis follow up - tore between wanting to let body heal or starting a new medication. Today she thinks she skin is better from where she first started.      BÁRBARA HoffmanA

## 2022-09-02 ENCOUNTER — TELEPHONE (OUTPATIENT)
Dept: DERMATOLOGY | Facility: CLINIC | Age: 22
End: 2022-09-02

## 2022-09-02 NOTE — TELEPHONE ENCOUNTER
Called pt to schedule her Nov appointment regarding to our messages through "[employer]". Pt cannot do T and TH so she cannot see the residents. Switched pt to . That's the only time that works for her.

## 2022-11-19 ENCOUNTER — HEALTH MAINTENANCE LETTER (OUTPATIENT)
Age: 22
End: 2022-11-19

## 2022-11-30 ENCOUNTER — TELEPHONE (OUTPATIENT)
Dept: DERMATOLOGY | Facility: CLINIC | Age: 22
End: 2022-11-30

## 2022-11-30 ENCOUNTER — OFFICE VISIT (OUTPATIENT)
Dept: DERMATOLOGY | Facility: CLINIC | Age: 22
End: 2022-11-30
Payer: COMMERCIAL

## 2022-11-30 DIAGNOSIS — L20.9 ATOPIC DERMATITIS, UNSPECIFIED TYPE: Primary | ICD-10-CM

## 2022-11-30 PROCEDURE — 99214 OFFICE O/P EST MOD 30 MIN: CPT | Performed by: DERMATOLOGY

## 2022-11-30 ASSESSMENT — PAIN SCALES - GENERAL: PAINLEVEL: NO PAIN (0)

## 2022-11-30 NOTE — LETTER
"11/30/2022       RE: Crys Steel  [id number] [facility] [address]     Dear Colleague,    Thank you for referring your patient, Crys Steel, to the [facility] DERMATOLOGY CLINIC [place] at [facility]. Please see a copy of my visit note below.    [facility] Dermatology Note  Encounter Date: Nov 30, 2022  Office Visit     Dermatology Problem List:  # Intrinsic Atopic dermatitis  - face TAC 0.025%, body TAC 0.1%, mupirocin for impetiginized areas on face, gentle skin cares  - plan to start nbUVB TIB next week 11/30/22  - previous: failed protopic, dupixent (started 8/2021: patient stopped 12/2021 because she did not want to inject herself)  - patient does not want to pursue or injectables or systemic immunomodulation      # Contact sensitization to potassium dichromate, methyl dibromo glutaronitrile, propolis, and irritant reactions to fragrance mix/cinnamic alcohol and benzoyl peroxide      # Positive atopy screen with immediate type reactions to house dust mites and molds  ____________________________________________    Assessment & Plan:    # Severe atopic dermatitis. Chronic, flare/not at goal.   Patient was previously on dupixent from 8/21 to 12/21 with improvement, but this was discontinued as she did not want to continue injecting herself (she becomes very anxious with injections and lab draws). Patient would like to begin phototherapy. She attends the [employer] for college and lives very close to the clinic. We will plan on starting nbUVB three times per week.   - continue bleach baths 2-3 days per week.   - continue bleach baths with \"soak and smear\" method of triamcinolone 0.01% ointment daily and liberal application of a thick moisturizer such as Aquaphor.   - continue triamcinolone 0.025% ointment to face  - start nbUVB phototherapy three times per week; will obtain full body skin examination at " next visit    Follow-up: 3 month(s) in-person, or earlier for new or changing lesions    Staff and Medical Student:     This patient was staffed and seen with Dr. Early.     Jonathan Collado, MS3  [facility] [employer]    Staff Physician:  I was present with the medical student who participated in the service and in the documentation of the note. I have verified the history and personally performed the physical exam and medical decision making. I agree with the assessment and plan of care as documented in the note.     Alejandro Early MD  Pronouns: he/him/his    Department of Dermatology  [facility]: Phone: [phone], Fax:[phone]  [facility]: Phone: [phone] Fax: [phone]  ____________________________________________    CC: Derm Problem (Tamra is here for a dermatitis follow-up. States condition has improved since last seen.)    HPI:  Ms. Crys Steel is a(n) 21 year old female who presents today as a return patient for atopic dermatitis. Last seen in dermatology by Dr. Sukumar Polk on 8/18/2022, at which time patient was started on beach baths for treatment of severe atopic dermatitis.    Today, she notes she would like to start nbUVB three times per week. She has been using face TAC 0.025%, body TAC 0.1%, mupirocin for impetiginized areas. She shares that she is nervous about becoming dependent on the various ointments long term. She does endorse that when she uses them regularly they work well, but on cessation she experiences flairs.     Patient is otherwise feeling well, without additional skin concerns.    Labs Reviewed:  N/A    Physical Exam:  Vitals: There were no vitals taken for this visit.  SKIN: Sun-exposed skin, which includes the head/face, neck, both arms, digits, and/or nails was examined.   - There is xerosis of the face   - Post  inflammatory hypopigmentation and eczematous plaques on the flexor creases of he bilateral elbows    - No other lesions of concern on areas examined.     Medications:  Current Outpatient Medications   Medication     ALEJANDRO FE 1/20 1-20 MG-MCG tablet     mupirocin (BACTROBAN) 2 % external ointment     tacrolimus (PROTOPIC) 0.03 % external ointment     tacrolimus (PROTOPIC) 0.03 % external ointment     triamcinolone (KENALOG) 0.025 % external ointment     triamcinolone (KENALOG) 0.025 % external ointment     triamcinolone (KENALOG) 0.1 % external ointment     No current facility-administered medications for this visit.      Past Medical History:   There is no problem list on file for this patient.    No past medical history on file.         Again, thank you for allowing me to participate in the care of your patient.      Sincerely,    Alejandro Early MD

## 2022-11-30 NOTE — NURSING NOTE
Dermatology Rooming Note    Crys Steel's goals for this visit include:   Chief Complaint   Patient presents with     Derm Problem     Tamra is here for a dermatitis follow-up. States condition has improved since last seen.     Darien Avendano, EMT

## 2022-11-30 NOTE — TELEPHONE ENCOUNTER
"Mayra Harris  You 19 minutes ago (10:06 AM)     DL  Hello,   I verified her insurance and \"NO\" prior auth is required for the NBUVB treatments.  Coverage based on medical necessity.   ThanksMayra" BACK PAIN

## 2022-11-30 NOTE — TELEPHONE ENCOUNTER
Robbym. Sent [employer] informing patient to call [phone] to schedule Light treatment therapy in Dermatology.

## 2022-11-30 NOTE — PROGRESS NOTES
"[facility] Dermatology Note  Encounter Date: Nov 30, 2022  Office Visit     Dermatology Problem List:  # Intrinsic Atopic dermatitis  - face TAC 0.025%, body TAC 0.1%, mupirocin for impetiginized areas on face, gentle skin cares  - plan to start nbUVB TIB next week 11/30/22  - previous: failed protopic, dupixent (started 8/2021: patient stopped 12/2021 because she did not want to inject herself)  - patient does not want to pursue or injectables or systemic immunomodulation      # Contact sensitization to potassium dichromate, methyl dibromo glutaronitrile, propolis, and irritant reactions to fragrance mix/cinnamic alcohol and benzoyl peroxide      # Positive atopy screen with immediate type reactions to house dust mites and molds  ____________________________________________    Assessment & Plan:    # Severe atopic dermatitis. Chronic, flare/not at goal.   Patient was previously on dupixent from 8/21 to 12/21 with improvement, but this was discontinued as she did not want to continue injecting herself (she becomes very anxious with injections and lab draws). Patient would like to begin phototherapy. She attends the [employer] for college and lives very close to the clinic. We will plan on starting nbUVB three times per week.   - continue bleach baths 2-3 days per week.   - continue bleach baths with \"soak and smear\" method of triamcinolone 0.01% ointment daily and liberal application of a thick moisturizer such as Aquaphor.   - continue triamcinolone 0.025% ointment to face  - start nbUVB phototherapy three times per week; will obtain full body skin examination at next visit    Follow-up: 3 month(s) in-person, or earlier for new or changing lesions    Staff and Medical Student:     This patient was staffed and seen with Dr. Early.     Jonathan Collado, MS3  [facility] [employer]    Staff Physician:  I was present with the medical student who participated in the service and in the " documentation of the note. I have verified the history and personally performed the physical exam and medical decision making. I agree with the assessment and plan of care as documented in the note.     Alejandro Early MD  Pronouns: he/him/his    Department of Dermatology  [facility]: Phone: [phone], Fax:[phone]  [facility]: Phone: [phone] Fax: [phone]  ____________________________________________    CC: Derm Problem (Tamra is here for a dermatitis follow-up. States condition has improved since last seen.)    HPI:  Ms. Crys Steel is a(n) 21 year old female who presents today as a return patient for atopic dermatitis. Last seen in dermatology by Dr. Sukumar Polk on 8/18/2022, at which time patient was started on beach baths for treatment of severe atopic dermatitis.    Today, she notes she would like to start nbUVB three times per week. She has been using face TAC 0.025%, body TAC 0.1%, mupirocin for impetiginized areas. She shares that she is nervous about becoming dependent on the various ointments long term. She does endorse that when she uses them regularly they work well, but on cessation she experiences flairs.     Patient is otherwise feeling well, without additional skin concerns.    Labs Reviewed:  N/A    Physical Exam:  Vitals: There were no vitals taken for this visit.  SKIN: Sun-exposed skin, which includes the head/face, neck, both arms, digits, and/or nails was examined.   - There is xerosis of the face   - Post inflammatory hypopigmentation and eczematous plaques on the flexor creases of he bilateral elbows    - No other lesions of concern on areas examined.     Medications:  Current Outpatient Medications   Medication     ALEJANDRO FE 1/20 1-20 MG-MCG tablet     mupirocin (BACTROBAN) 2 % external ointment     tacrolimus (PROTOPIC) 0.03 % external  ointment     tacrolimus (PROTOPIC) 0.03 % external ointment     triamcinolone (KENALOG) 0.025 % external ointment     triamcinolone (KENALOG) 0.025 % external ointment     triamcinolone (KENALOG) 0.1 % external ointment     No current facility-administered medications for this visit.      Past Medical History:   There is no problem list on file for this patient.    No past medical history on file.

## 2022-12-07 NOTE — TELEPHONE ENCOUNTER
Clinic Coordinators have been trying to reach patient to schedule. See mychart encounter for more information. Closing this encounter.    Darien Avendano, EMT

## 2022-12-17 ENCOUNTER — PATIENT OUTREACH (OUTPATIENT)
Dept: DERMATOLOGY | Facility: CLINIC | Age: 22
End: 2022-12-17

## 2022-12-17 NOTE — TELEPHONE ENCOUNTER
Attempted to reach patient to schedule follow up in the Dermatology Clinic.  No answer,  No VM and MyChart message sent..    Schedule with Dr. Alejandro Early Feb/Mar 2023. OK to use IRVIN slot per checkout note.

## 2022-12-28 ENCOUNTER — ALLIED HEALTH/NURSE VISIT (OUTPATIENT)
Dept: DERMATOLOGY | Facility: CLINIC | Age: 22
End: 2022-12-28
Payer: COMMERCIAL

## 2022-12-28 DIAGNOSIS — L20.9 ATOPIC DERMATITIS, UNSPECIFIED TYPE: ICD-10-CM

## 2022-12-28 PROCEDURE — 96900 ACTINOTHERAPY UV LIGHT: CPT | Performed by: DERMATOLOGY

## 2022-12-28 NOTE — NURSING NOTE
Crys Steel comes into clinic today at the request of Alejandro Early MD Ordering Provider for NBUVB.         This service provided today was under the supervising provider of the day Dr. Kilgore, who was available if needed.    Cornelia Esteban RN    [facility] Dermatology Phototherapy Record  1. Crys Steel is a 22 year old female is here today for phototherapy (NBUVB) treatment for atopic dermatitis.        Changes or new medications since last treatment (If yes, notify MD):  N/A    New medical conditions (If yes, notify MD):  NO    Any problems with last phototherapy treatment (If yes, notify MD)?  N/A    Patient denies any remaining skin redness since last treatment (If no, do not treat. Do not treat red skin):  N/A    Did staff apply any topicals on patient?  NO  If yes, which topical?      Did patient self apply any topicals?  NO  If yes, which topical?         2. The patient tolerated phototherapy without complication.    Patient will return per protocol for next UVB treatment, per protocol.     Patient to see provider every 4-12 weeks for follow-up during treatment (if no, notify treating physician):  YES.     All questions and concerns discussed with patient in clinic today.    Cornelia Esteban RN

## 2022-12-29 ENCOUNTER — ALLIED HEALTH/NURSE VISIT (OUTPATIENT)
Dept: DERMATOLOGY | Facility: CLINIC | Age: 22
End: 2022-12-29
Payer: COMMERCIAL

## 2022-12-29 DIAGNOSIS — L20.9 ATOPIC DERMATITIS, UNSPECIFIED TYPE: ICD-10-CM

## 2022-12-29 PROCEDURE — 96900 ACTINOTHERAPY UV LIGHT: CPT | Performed by: DERMATOLOGY

## 2023-01-03 ENCOUNTER — ALLIED HEALTH/NURSE VISIT (OUTPATIENT)
Dept: DERMATOLOGY | Facility: CLINIC | Age: 23
End: 2023-01-03
Payer: COMMERCIAL

## 2023-01-03 DIAGNOSIS — L20.9 ATOPIC DERMATITIS, UNSPECIFIED TYPE: ICD-10-CM

## 2023-01-03 PROCEDURE — 96900 ACTINOTHERAPY UV LIGHT: CPT | Performed by: DERMATOLOGY

## 2023-01-03 NOTE — PROGRESS NOTES
[facility] Dermatology Phototherapy Record  1. Crys Steel is a 22 year old female is here today for phototherapy (UVB) treatment for Atopic dermatitis, unspecified type.        Changes or new medications since last treatment (If yes, notify MD):  NO    New medical conditions (If yes, notify MD):  NO    Any problems with last phototherapy treatment (If yes, notify MD)?  NO    Patient denies any remaining skin redness since last treatment (If no, do not treat. Do not treat red skin):  NO    Did staff apply any topicals on patient?  NO  If yes, which topical?      Did patient self apply any topicals?  NO  If yes, which topical?        2. The patient tolerated phototherapy without complication.    Patient will return per protocol for next UVB treatment, per protocol.     Patient to see provider every 4-12 weeks for follow-up during treatment (if no, notify treating physician):  YES.     All questions and concerns discussed with patient in clinic today.    Crys Steel comes into clinic today at the request of Dr Early Ordering Provider for NBUVB.      This service provided today was under the supervising provider of the day Dr Kilgore, who was available if needed.    Tamar Ross RN

## 2023-01-04 ENCOUNTER — ALLIED HEALTH/NURSE VISIT (OUTPATIENT)
Dept: DERMATOLOGY | Facility: CLINIC | Age: 23
End: 2023-01-04
Payer: COMMERCIAL

## 2023-01-04 DIAGNOSIS — L20.9 ATOPIC DERMATITIS, UNSPECIFIED TYPE: ICD-10-CM

## 2023-01-04 PROCEDURE — 96900 ACTINOTHERAPY UV LIGHT: CPT | Performed by: DERMATOLOGY

## 2023-01-04 NOTE — PROGRESS NOTES
Crys Steel comes into clinic today at the request of Dr. Early Ordering Provider.        This service provided today was under the supervising provider of the day Dr. Kilgore, who was available if needed.    Tres Burch    [facility] Dermatology Phototherapy Record  1. Crys Steel is a 22 year old female is here today for phototherapy (UVB) treatment for Atopic dermatitis, unspecified type [L20.9]  - Primary .        Changes or new medications since last treatment (If yes, notify MD):  NO    New medical conditions (If yes, notify MD):  NO    Any problems with last phototherapy treatment (If yes, notify MD)?  NO    Patient denies any remaining skin redness since last treatment (If no, do not treat. Do not treat red skin):  YES    Did staff apply any topicals on patient?  NO  If yes, which topical?      Did patient self apply any topicals?  NO  If yes, which topical?      The patient was offered umderuvbhandfoot or deruvbfullbody AVS : N/A.     2. The patient tolerated phototherapy without complication.    Patient will return per protocol for next UVB treatment, per protocol.     Patient to see provider every 4-12 weeks for follow-up during treatment (if no, notify treating physician):  YES.     All questions and concerns discussed with patient in clinic today.    Tres Burch

## 2023-01-06 ENCOUNTER — ALLIED HEALTH/NURSE VISIT (OUTPATIENT)
Dept: DERMATOLOGY | Facility: CLINIC | Age: 23
End: 2023-01-06
Payer: COMMERCIAL

## 2023-01-06 DIAGNOSIS — L20.9 ATOPIC DERMATITIS, UNSPECIFIED TYPE: ICD-10-CM

## 2023-01-06 PROCEDURE — 96900 ACTINOTHERAPY UV LIGHT: CPT | Performed by: DERMATOLOGY

## 2023-01-06 NOTE — PROGRESS NOTES
Crys Steel comes into clinic today at the request of  Ordering Provider.        This service provided today was under the supervising provider of the day , who was available if needed.      [facility] Dermatology Phototherapy Record  1. Crys Steel is a 22 year old female is here today for phototherapy (UVB) treatment for Atopic dermatitis, unspecified type [L20.9]  - Primary .        Changes or new medications since last treatment (If yes, notify MD):  NO    New medical conditions (If yes, notify MD):  NO    Any problems with last phototherapy treatment (If yes, notify MD)?  NO    Patient denies any remaining skin redness since last treatment (If no, do not treat. Do not treat red skin):  YES    Did staff apply any topicals on patient?  NO  If yes, which topical?      Did patient self apply any topicals?  NO  If yes, which topical?      The patient was offered umdermhuvbhandfoot or umdermhuvbfullbody AVS : N/A.     2. The patient tolerated phototherapy without complication.    Patient will return per protocol for next UVB treatment, per protocol.     Patient to see provider every 4-12 weeks for follow-up during treatment (if no, notify treating physician):  YES.     All questions and concerns discussed with patient in clinic today.    Tres Burch  
no

## 2023-01-10 ENCOUNTER — ALLIED HEALTH/NURSE VISIT (OUTPATIENT)
Dept: DERMATOLOGY | Facility: CLINIC | Age: 23
End: 2023-01-10
Payer: COMMERCIAL

## 2023-01-10 DIAGNOSIS — L20.9 ATOPIC DERMATITIS, UNSPECIFIED TYPE: ICD-10-CM

## 2023-01-10 PROCEDURE — 96900 ACTINOTHERAPY UV LIGHT: CPT | Performed by: DERMATOLOGY

## 2023-01-10 NOTE — PROGRESS NOTES
[facility] Dermatology Phototherapy Record  1. Crys Steel is a 22 year old female is here today for phototherapy (UVB) treatment for Atopic dermatitis, unspecified type.        Changes or new medications since last treatment (If yes, notify MD):  NO    New medical conditions (If yes, notify MD):  NO    Any problems with last phototherapy treatment (If yes, notify MD)?  NO    Patient denies any remaining skin redness since last treatment (If no, do not treat. Do not treat red skin):  YES    Did staff apply any topicals on patient?  NO  If yes, which topical?      Did patient self apply any topicals?  NO  If yes, which topical?        2. The patient tolerated phototherapy without complication.    Patient will return per protocol for next UVB treatment, per protocol.     Patient to see provider every 4-12 weeks for follow-up during treatment (if no, notify treating physician):  YES.     All questions and concerns discussed with patient in clinic today.    Crys Steel comes into clinic today at the request of Dr Early Ordering Provider for NBUVB.        This service provided today was under the supervising provider of the day Dr Kilgore, who was available if needed.    Tamar Ross RN

## 2023-01-11 ENCOUNTER — ALLIED HEALTH/NURSE VISIT (OUTPATIENT)
Dept: DERMATOLOGY | Facility: CLINIC | Age: 23
End: 2023-01-11
Payer: COMMERCIAL

## 2023-01-11 DIAGNOSIS — L20.9 ATOPIC DERMATITIS, UNSPECIFIED TYPE: ICD-10-CM

## 2023-01-11 PROCEDURE — 96900 ACTINOTHERAPY UV LIGHT: CPT | Performed by: DERMATOLOGY

## 2023-01-13 ENCOUNTER — ALLIED HEALTH/NURSE VISIT (OUTPATIENT)
Dept: DERMATOLOGY | Facility: CLINIC | Age: 23
End: 2023-01-13
Payer: COMMERCIAL

## 2023-01-13 DIAGNOSIS — L20.9 ATOPIC DERMATITIS, UNSPECIFIED TYPE: ICD-10-CM

## 2023-01-13 PROCEDURE — 96900 ACTINOTHERAPY UV LIGHT: CPT | Performed by: DERMATOLOGY

## 2023-01-13 NOTE — PROGRESS NOTES
Crys Steel comes into clinic today at the request of  Ordering Provider.        This service provided today was under the supervising provider of the day , who was available if needed.    Tres Burch    [facility] Dermatology Phototherapy Record  1. Crys Steel is a 22 year old female is here today for phototherapy (UVB) treatment for Atopic dermatitis, unspecified type [L20.9]  - Primary .        Changes or new medications since last treatment (If yes, notify MD):  NO    New medical conditions (If yes, notify MD):  NO    Any problems with last phototherapy treatment (If yes, notify MD)?  NO    Patient denies any remaining skin redness since last treatment (If no, do not treat. Do not treat red skin):  YES    Did staff apply any topicals on patient?  NO  If yes, which topical?      Did patient self apply any topicals?  NO  If yes, which topical?      The patient was offered umdermhuvbhandfoot or umdermhuvbfullbody AVS : N/A.     2. The patient tolerated phototherapy without complication.    Patient will return per protocol for next UVB treatment, per protocol.     Patient to see provider every 4-12 weeks for follow-up during treatment (if no, notify treating physician):  YES.     All questions and concerns discussed with patient in clinic today.    Tres Burch

## 2023-01-18 ENCOUNTER — ALLIED HEALTH/NURSE VISIT (OUTPATIENT)
Dept: DERMATOLOGY | Facility: CLINIC | Age: 23
End: 2023-01-18
Payer: COMMERCIAL

## 2023-01-18 DIAGNOSIS — L20.9 ATOPIC DERMATITIS, UNSPECIFIED TYPE: ICD-10-CM

## 2023-01-18 PROCEDURE — 96900 ACTINOTHERAPY UV LIGHT: CPT | Performed by: DERMATOLOGY

## 2023-01-18 PROCEDURE — 99207 PR NO CHARGE NURSE ONLY: CPT | Performed by: DERMATOLOGY

## 2023-01-18 NOTE — PROGRESS NOTES
[facility] Dermatology Phototherapy Record  1. Crys Steel is a 22 year old female is here today for phototherapy (UVB) treatment for Atopic dermatitis, unspecified type [L20.9].        Changes or new medications since last treatment (If yes, notify MD):  NO    New medical conditions (If yes, notify MD):  NO    Any problems with last phototherapy treatment (If yes, notify MD)?  NO    Patient denies any remaining skin redness since last treatment (If no, do not treat. Do not treat red skin):  YES    Did staff apply any topicals on patient?  NO  If yes, which topical?      Did patient self apply any topicals?  NO  If yes, which topical?      The patient was offered [facility] : YES.     2. The patient tolerated phototherapy without complication.    Patient will return per protocol for next UVB treatment, per protocol.     Patient to see provider every 4-12 weeks for follow-up during treatment (if no, notify treating physician):  YES.     All questions and concerns discussed with patient in clinic today.    Tati Medellin RN

## 2023-01-18 NOTE — NURSING NOTE
Crys Lopezjose Damián comes into clinic today at the request of [facility] Ordering Provider for UVB.    This service provided today was under the supervising provider of the day Dr. Kilgore, who was available if needed.    Tati Medellin RN

## 2023-01-19 ENCOUNTER — ALLIED HEALTH/NURSE VISIT (OUTPATIENT)
Dept: DERMATOLOGY | Facility: CLINIC | Age: 23
End: 2023-01-19
Payer: COMMERCIAL

## 2023-01-19 DIAGNOSIS — L20.9 ATOPIC DERMATITIS, UNSPECIFIED TYPE: ICD-10-CM

## 2023-01-19 PROCEDURE — 96900 ACTINOTHERAPY UV LIGHT: CPT | Performed by: DERMATOLOGY

## 2023-01-19 PROCEDURE — 99207 PR NO CHARGE NURSE ONLY: CPT | Performed by: DERMATOLOGY

## 2023-01-19 NOTE — PROGRESS NOTES
[facility] Dermatology Phototherapy Record  1. Crys Steel is a 22 year old female is here today for phototherapy (UVB) treatment for Atopic dermatitis, unspecified type .        Changes or new medications since last treatment (If yes, notify MD):  NO    New medical conditions (If yes, notify MD):  NO    Any problems with last phototherapy treatment (If yes, notify MD)?  NO    Patient denies any remaining skin redness since last treatment (If no, do not treat. Do not treat red skin):  YES    Did staff apply any topicals on patient?  NO  If yes, which topical?      Did patient self apply any topicals?  NO  If yes, which topical?      The patient was offered [facility] : YES.     2. The patient tolerated phototherapy without complication.    Patient will return per protocol for next UVB treatment, per protocol.     Patient to see provider every 4-12 weeks for follow-up during treatment (if no, notify treating physician):  YES.     All questions and concerns discussed with patient in clinic today.    Tati Medellin RN

## 2023-01-19 NOTE — NURSING NOTE
Crys Lopezjose Damián comes into clinic today at the request of Dr. Early Ordering Provider for UVB.    This service provided today was under the supervising provider of the day Dr. Kilgore, who was available if needed.    Tati Medellin RN

## 2023-01-23 ENCOUNTER — ALLIED HEALTH/NURSE VISIT (OUTPATIENT)
Dept: DERMATOLOGY | Facility: CLINIC | Age: 23
End: 2023-01-23
Payer: COMMERCIAL

## 2023-01-23 DIAGNOSIS — L20.9 ATOPIC DERMATITIS, UNSPECIFIED TYPE: ICD-10-CM

## 2023-01-23 PROCEDURE — 96900 ACTINOTHERAPY UV LIGHT: CPT | Performed by: DERMATOLOGY

## 2023-01-23 PROCEDURE — 99207 PR NO CHARGE NURSE ONLY: CPT | Performed by: DERMATOLOGY

## 2023-01-23 NOTE — PROGRESS NOTES
Crys Steel comes into clinic today at the request of Dr Early, ordering provider for phototherapy.    This service provided today was under the supervising provider of the day Dr Kilgore, who was available if needed.    [facility] Dermatology Phototherapy Record  1. Crys Steel is a 22 year old female is here today for phototherapy (UVB) treatment for Atopic dermatitis, unspecified type .        Changes or new medications since last treatment (If yes, notify MD):  NO    New medical conditions (If yes, notify MD):  NO    Any problems with last phototherapy treatment (If yes, notify MD)?  NO    Patient denies any remaining skin redness since last treatment (If no, do not treat. Do not treat red skin):  YES    Did staff apply any topicals on patient?  NO  If yes, which topical?      Did patient self apply any topicals?  NO  If yes, which topical?      2. The patient tolerated phototherapy without complication.    Patient will return per protocol for next UVB treatment, per protocol.     Patient to see provider every 4-12 weeks for follow-up during treatment (if no, notify treating physician):  YES.     All questions and concerns discussed with patient in clinic today.    Tamar Ross RN

## 2023-01-25 ENCOUNTER — ALLIED HEALTH/NURSE VISIT (OUTPATIENT)
Dept: DERMATOLOGY | Facility: CLINIC | Age: 23
End: 2023-01-25
Payer: COMMERCIAL

## 2023-01-25 DIAGNOSIS — L20.9 ATOPIC DERMATITIS, UNSPECIFIED TYPE: ICD-10-CM

## 2023-01-25 PROCEDURE — 99207 PR NO CHARGE NURSE ONLY: CPT | Performed by: DERMATOLOGY

## 2023-01-25 PROCEDURE — 96900 ACTINOTHERAPY UV LIGHT: CPT | Performed by: DERMATOLOGY

## 2023-01-25 NOTE — PROGRESS NOTES
Crys Steel comes into clinic today at the request of Dr. Early, ordering provider for phototherapy.    This service provided today was under the supervising provider of the day , who was available if needed.    [facility] Dermatology Phototherapy Record  1. Crys Steel is a 22 year old female is here today for phototherapy (NBUVB) treatment for Atopic dermatitis.        Changes or new medications since last treatment (If yes, notify MD):  NO    New medical conditions (If yes, notify MD):  NO    Any problems with last phototherapy treatment (If yes, notify MD)?  NO    Patient denies any remaining skin redness since last treatment (If no, do not treat. Do not treat red skin):  YES    Did staff apply any topicals on patient?  NO      Did patient self apply any topicals?  NO      2. The patient tolerated phototherapy without complication.    Patient will return per protocol for next NBUVB treatment, per protocol.     Patient to see provider every 4-12 weeks for follow-up during treatment (if no, notify treating physician):  YES.     All questions and concerns discussed with patient in clinic today.    Jennifer Boucher MA

## 2023-01-27 ENCOUNTER — ALLIED HEALTH/NURSE VISIT (OUTPATIENT)
Dept: DERMATOLOGY | Facility: CLINIC | Age: 23
End: 2023-01-27
Payer: COMMERCIAL

## 2023-01-27 DIAGNOSIS — L20.9 ATOPIC DERMATITIS, UNSPECIFIED TYPE: ICD-10-CM

## 2023-01-27 PROCEDURE — 96900 ACTINOTHERAPY UV LIGHT: CPT | Performed by: DERMATOLOGY

## 2023-01-27 NOTE — PROGRESS NOTES
Crys Steel comes into clinic today at the request of Dr. Early, ordering provider for phototherapy.    This service provided today was under the supervising provider of the day  , who was available if needed.    [facility] Dermatology Phototherapy Record  1. Crys Steel is a 22 year old female is here today for phototherapy (UVB) treatment for Atopic dermatitis, unspecified type [L20.9]  - Primary .        Changes or new medications since last treatment (If yes, notify MD):  NO    New medical conditions (If yes, notify MD):  NO    Any problems with last phototherapy treatment (If yes, notify MD)?  NO    Patient denies any remaining skin redness since last treatment (If no, do not treat. Do not treat red skin):  YES    Did staff apply any topicals on patient?  N/A  If yes, which topical?      Did patient self apply any topicals?  N/A  If yes, which topical?      The patient was offered umderuvbhandfoot or umderuvbfullbody AVS : YES.     2. The patient tolerated phototherapy without complication.    Patient will return per protocol for next UVB treatment, per protocol.     Patient to see provider every 4-12 weeks for follow-up during treatment (if no, notify treating physician):  YES.     All questions and concerns discussed with patient in clinic today.    Tres Burch

## 2023-01-30 ENCOUNTER — ALLIED HEALTH/NURSE VISIT (OUTPATIENT)
Dept: DERMATOLOGY | Facility: CLINIC | Age: 23
End: 2023-01-30
Payer: COMMERCIAL

## 2023-01-30 DIAGNOSIS — L20.9 ATOPIC DERMATITIS, UNSPECIFIED TYPE: ICD-10-CM

## 2023-01-30 PROCEDURE — 99207 PR NO CHARGE NURSE ONLY: CPT | Performed by: DERMATOLOGY

## 2023-01-30 PROCEDURE — 96900 ACTINOTHERAPY UV LIGHT: CPT | Performed by: DERMATOLOGY

## 2023-01-30 NOTE — PROGRESS NOTES
Crys Steel comes into clinic today at the request of Dr Early, ordering provider for phototherapy.    This service provided today was under the supervising provider of the day dr Kilgore, who was available if needed.    [facility] Dermatology Phototherapy Record  1. Crys Steel is a 22 year old female is here today for phototherapy (UVB) treatment for Atopic dermatitis, unspecified type.        Changes or new medications since last treatment (If yes, notify MD):  NO    New medical conditions (If yes, notify MD):  NO    Any problems with last phototherapy treatment (If yes, notify MD)?  NO    Patient denies any remaining skin redness since last treatment (If no, do not treat. Do not treat red skin):  YES    Did staff apply any topicals on patient?  NO  If yes, which topical?      Did patient self apply any topicals?  NO  If yes, which topical?        2. The patient tolerated phototherapy without complication.    Patient will return per protocol for next UVB treatment, per protocol.     Patient to see provider every 4-12 weeks for follow-up during treatment (if no, notify treating physician):  YES.     All questions and concerns discussed with patient in clinic today.    Tamar Ross RN

## 2023-02-01 ENCOUNTER — ALLIED HEALTH/NURSE VISIT (OUTPATIENT)
Dept: DERMATOLOGY | Facility: CLINIC | Age: 23
End: 2023-02-01
Payer: COMMERCIAL

## 2023-02-01 DIAGNOSIS — L20.9 ATOPIC DERMATITIS, UNSPECIFIED TYPE: ICD-10-CM

## 2023-02-01 PROCEDURE — 96900 ACTINOTHERAPY UV LIGHT: CPT | Performed by: DERMATOLOGY

## 2023-02-01 NOTE — PROGRESS NOTES
Crys Steel comes into clinic today at the request of Dr. Early, ordering provider for phototherapy.     This service provided today was under the supervising provider of the day , who was available if needed.     [facility] Dermatology Phototherapy Record  1. Crys Steel is a 22 year old female is here today for phototherapy (NBUVB) treatment for Atopic dermatitis.         Changes or new medications since last treatment (If yes, notify MD):  NO    New medical conditions (If yes, notify MD):  NO    Any problems with last phototherapy treatment (If yes, notify MD)?  NO    Patient denies any remaining skin redness since last treatment (If no, do not treat. Do not treat red skin):  YES    Did staff apply any topicals on patient?  NO      Did patient self apply any topicals?  NO       2. The patient tolerated phototherapy without complication.  - Patient will return per protocol for next NBUVB treatment, per protocol.   - Patient to see provider every 4-12 weeks for follow-up during treatment (if no, notify treating physician):  YES.   - All questions and concerns discussed with patient in clinic today.     Jennifer Boucher MA

## 2023-02-03 ENCOUNTER — ALLIED HEALTH/NURSE VISIT (OUTPATIENT)
Dept: DERMATOLOGY | Facility: CLINIC | Age: 23
End: 2023-02-03
Payer: COMMERCIAL

## 2023-02-03 DIAGNOSIS — L20.9 ATOPIC DERMATITIS, UNSPECIFIED TYPE: ICD-10-CM

## 2023-02-03 PROCEDURE — 96900 ACTINOTHERAPY UV LIGHT: CPT | Performed by: DERMATOLOGY

## 2023-02-03 NOTE — PROGRESS NOTES
Crys Steel comes into clinic today at the request of Dr. Early, ordering provider for phototherapy.     This service provided today was under the supervising provider of the day Dr. Buckner, who was available if needed.     [facility] Dermatology Phototherapy Record  1. Crys Steel is a 22 year old female is here today for phototherapy (NBUVB) treatment for Atopic dermatitis.         Changes or new medications since last treatment (If yes, notify MD):  NO    New medical conditions (If yes, notify MD):  NO    Any problems with last phototherapy treatment (If yes, notify MD)?  NO    Patient denies any remaining skin redness since last treatment (If no, do not treat. Do not treat red skin):  YES    Did staff apply any topicals on patient?  NO      Did patient self apply any topicals?  NO       2. The patient tolerated phototherapy without complication.  - Patient will return per protocol for next NBUVB treatment, per protocol.   - Patient to see provider every 4-12 weeks for follow-up during treatment (if no, notify treating physician):  YES.   - All questions and concerns discussed with patient in clinic today.     Jennifer Boucher MA

## 2023-02-06 ENCOUNTER — ALLIED HEALTH/NURSE VISIT (OUTPATIENT)
Dept: DERMATOLOGY | Facility: CLINIC | Age: 23
End: 2023-02-06
Payer: COMMERCIAL

## 2023-02-06 DIAGNOSIS — L20.9 ATOPIC DERMATITIS, UNSPECIFIED TYPE: ICD-10-CM

## 2023-02-06 PROCEDURE — 96900 ACTINOTHERAPY UV LIGHT: CPT | Performed by: DERMATOLOGY

## 2023-02-06 NOTE — PROGRESS NOTES
Crys Steel comes into clinic today at the request of Dr. Early, ordering provider for phototherapy.    This service provided today was under the supervising provider of the day Dr. Kilgore, who was available if needed.    [facility] Dermatology Phototherapy Record  1. Crys Steel is a 22 year old female is here today for phototherapy (UVB) treatment for Atopic dermatitis, unspecified type.        Changes or new medications since last treatment (If yes, notify MD):  NO    New medical conditions (If yes, notify MD):  NO    Any problems with last phototherapy treatment (If yes, notify MD)?  NO    Patient denies any remaining skin redness since last treatment (If no, do not treat. Do not treat red skin):  YES    Did staff apply any topicals on patient?  NO  If yes, which topical?      Did patient self apply any topicals?  NO  If yes, which topical?      2. The patient tolerated phototherapy without complication.    Patient will return per protocol for next UVB treatment, per protocol.     Patient to see provider every 4-12 weeks for follow-up during treatment (if no, notify treating physician):  YES.     All questions and concerns discussed with patient in clinic today.    Tati Medellin RN

## 2023-02-08 ENCOUNTER — ALLIED HEALTH/NURSE VISIT (OUTPATIENT)
Dept: DERMATOLOGY | Facility: CLINIC | Age: 23
End: 2023-02-08
Payer: COMMERCIAL

## 2023-02-08 DIAGNOSIS — L20.9 ATOPIC DERMATITIS, UNSPECIFIED TYPE: ICD-10-CM

## 2023-02-08 PROCEDURE — 96900 ACTINOTHERAPY UV LIGHT: CPT | Performed by: DERMATOLOGY

## 2023-02-08 NOTE — PROGRESS NOTES
Crys Steel comes into clinic today at the request of Dr. Early, ordering provider for phototherapy.     This service provided today was under the supervising provider of the day Dr. Kilgore, who was available if needed.     [facility] Dermatology Phototherapy Record  1. Crys Steel is a 22 year old female is here today for phototherapy (NBUVB) treatment for Atopic dermatitis.         Changes or new medications since last treatment (If yes, notify MD):  NO    New medical conditions (If yes, notify MD):  NO    Any problems with last phototherapy treatment (If yes, notify MD)?  NO    Patient denies any remaining skin redness since last treatment (If no, do not treat. Do not treat red skin):  YES    Did staff apply any topicals on patient?  NO      Did patient self apply any topicals?  NO       2. The patient tolerated phototherapy without complication.  - Patient will return per protocol for next NBUVB treatment, per protocol.   - Patient to see provider every 4-12 weeks for follow-up during treatment (if no, notify treating physician):  YES.   - All questions and concerns discussed with patient in clinic today.     Jennifer Boucher MA

## 2023-02-10 ENCOUNTER — ALLIED HEALTH/NURSE VISIT (OUTPATIENT)
Dept: DERMATOLOGY | Facility: CLINIC | Age: 23
End: 2023-02-10
Payer: COMMERCIAL

## 2023-02-10 DIAGNOSIS — L20.9 ATOPIC DERMATITIS, UNSPECIFIED TYPE: ICD-10-CM

## 2023-02-10 PROCEDURE — 96900 ACTINOTHERAPY UV LIGHT: CPT | Performed by: DERMATOLOGY

## 2023-02-10 NOTE — PROGRESS NOTES
Crys Steel comes into clinic today at the request of Dr. Early, ordering provider for phototherapy.    This service provided today was under the supervising provider of the day Dr. Watts, who was available if needed.    [facility] Dermatology Phototherapy Record  1. Crys Steel is a 22 year old female is here today for phototherapy (UVB) treatment for Atopic dermatitis, unspecified type [L20.9]  - Primary .        Changes or new medications since last treatment (If yes, notify MD):  NO    New medical conditions (If yes, notify MD):  NO    Any problems with last phototherapy treatment (If yes, notify MD)?  NO    Patient denies any remaining skin redness since last treatment (If no, do not treat. Do not treat red skin):  YES    Did staff apply any topicals on patient?  NO  If yes, which topical?       Did patient self apply any topicals?  NO  If yes, which topical?      The patient was offered umdermhuvbhandfoot or umderuvbfullbody AVS : N/A.     2. The patient tolerated phototherapy without complication.    Patient will return per protocol for next UVB treatment, per protocol.     Patient to see provider every 4-12 weeks for follow-up during treatment (if no, notify treating physician):  YES.     All questions and concerns discussed with patient in clinic today.    Tres Burch

## 2023-02-13 ENCOUNTER — ALLIED HEALTH/NURSE VISIT (OUTPATIENT)
Dept: DERMATOLOGY | Facility: CLINIC | Age: 23
End: 2023-02-13
Payer: COMMERCIAL

## 2023-02-13 DIAGNOSIS — L20.9 ATOPIC DERMATITIS, UNSPECIFIED TYPE: ICD-10-CM

## 2023-02-13 PROCEDURE — 96900 ACTINOTHERAPY UV LIGHT: CPT | Performed by: DERMATOLOGY

## 2023-02-13 NOTE — PROGRESS NOTES
Crys Steel comes into clinic today at the request of Dr Early, ordering provider for phototherapy.    This service provided today was under the supervising provider of the day Dr Kilgore, who was available if needed.    [facility] Dermatology Phototherapy Record  1. Crys Steel is a 22 year old female is here today for phototherapy (UVB) treatment for Atopic dermatitis, unspecified type.        Changes or new medications since last treatment (If yes, notify MD):  NO    New medical conditions (If yes, notify MD):  NO    Any problems with last phototherapy treatment (If yes, notify MD)?  NO    Patient denies any remaining skin redness since last treatment (If no, do not treat. Do not treat red skin):  YES    Did staff apply any topicals on patient?  NO  If yes, which topical?      Did patient self apply any topicals?  NO  If yes, which topical?        2. The patient tolerated phototherapy without complication.    Patient will return per protocol for next UVB treatment, per protocol.     Patient to see provider every 4-12 weeks for follow-up during treatment (if no, notify treating physician):  YES.     All questions and concerns discussed with patient in clinic today.    Tamar Ross RN

## 2023-02-15 ENCOUNTER — ALLIED HEALTH/NURSE VISIT (OUTPATIENT)
Dept: DERMATOLOGY | Facility: CLINIC | Age: 23
End: 2023-02-15
Payer: COMMERCIAL

## 2023-02-15 DIAGNOSIS — L20.9 ATOPIC DERMATITIS, UNSPECIFIED TYPE: ICD-10-CM

## 2023-02-15 PROCEDURE — 96900 ACTINOTHERAPY UV LIGHT: CPT | Performed by: DERMATOLOGY

## 2023-02-15 NOTE — PROGRESS NOTES
Crys Steel comes into clinic today at the request of Dr. Early, ordering provider for phototherapy.    This service provided today was under the supervising provider of the day Dr. Kilgore, who was available if needed.    [facility] Dermatology Phototherapy Record  1. Crys Steel is a 22 year old female is here today for phototherapy (UVB) treatment for Atopic dermatitis, unspecified type [L20.9]  - Primary .        Changes or new medications since last treatment (If yes, notify MD):  NO    New medical conditions (If yes, notify MD):  NO    Any problems with last phototherapy treatment (If yes, notify MD)?  NO    Patient denies any remaining skin redness since last treatment (If no, do not treat. Do not treat red skin):  YES    Did staff apply any topicals on patient?  NO  If yes, which topical?      Did patient self apply any topicals?  NO  If yes, which topical?      The patient was offered umderuvbhandfoot or deruvbfullbody AVS : N/A.     2. The patient tolerated phototherapy without complication.    Patient will return per protocol for next UVB treatment, per protocol.     Patient to see provider every 4-12 weeks for follow-up during treatment (if no, notify treating physician):  YES.     All questions and concerns discussed with patient in clinic today.    Tres Burch

## 2023-02-17 ENCOUNTER — ALLIED HEALTH/NURSE VISIT (OUTPATIENT)
Dept: DERMATOLOGY | Facility: CLINIC | Age: 23
End: 2023-02-17
Payer: COMMERCIAL

## 2023-02-17 DIAGNOSIS — L20.9 ATOPIC DERMATITIS, UNSPECIFIED TYPE: ICD-10-CM

## 2023-02-17 PROCEDURE — 96900 ACTINOTHERAPY UV LIGHT: CPT | Performed by: DERMATOLOGY

## 2023-02-17 NOTE — PROGRESS NOTES
Crys Steel comes into clinic today at the request of Dr. Early, ordering provider for phototherapy.    This service provided today was under the supervising provider of the day Dr. Estrada, who was available if needed.    [facility] Dermatology Phototherapy Record  1. Crys tSeel is a 22 year old female is here today for phototherapy (UVB) treatment for Atopic dermatitis.        Changes or new medications since last treatment (If yes, notify MD):  NO    New medical conditions (If yes, notify MD):  NO    Any problems with last phototherapy treatment (If yes, notify MD)?  NO    Patient denies any remaining skin redness since last treatment (If no, do not treat. Do not treat red skin):  YES    Did staff apply any topicals on patient?  NO  If yes, which topical?      Did patient self apply any topicals?  NO  If yes, which topical?      2. The patient tolerated phototherapy without complication.    Patient will return per protocol for next UVB treatment, per protocol.     Patient to see provider every 4-12 weeks for follow-up during treatment (if no, notify treating physician):  YES.     All questions and concerns discussed with patient in clinic today.    Tati Medellin RN

## 2023-02-20 ENCOUNTER — ALLIED HEALTH/NURSE VISIT (OUTPATIENT)
Dept: DERMATOLOGY | Facility: CLINIC | Age: 23
End: 2023-02-20
Payer: COMMERCIAL

## 2023-02-20 DIAGNOSIS — L20.9 ATOPIC DERMATITIS, UNSPECIFIED TYPE: ICD-10-CM

## 2023-02-20 PROCEDURE — 96900 ACTINOTHERAPY UV LIGHT: CPT | Performed by: DERMATOLOGY

## 2023-02-20 NOTE — PROGRESS NOTES
Crys Steel comes into clinic today at the request of Dr. Early, ordering provider for phototherapy.     This service provided today was under the supervising provider of the day Dr. Kilgore, who was available if needed.     [facility] Dermatology Phototherapy Record  1. Crys Steel is a 22 year old female is here today for phototherapy (NBUVB) treatment for Atopic dermatitis.         Changes or new medications since last treatment (If yes, notify MD):  NO    New medical conditions (If yes, notify MD):  NO    Any problems with last phototherapy treatment (If yes, notify MD)?  NO    Patient denies any remaining skin redness since last treatment (If no, do not treat. Do not treat red skin):  YES    Did staff apply any topicals on patient?  NO      Did patient self apply any topicals?  NO       2. The patient tolerated phototherapy without complication.  - Patient will return per protocol for next NBUVB treatment, per protocol.   - Patient to see provider every 4-12 weeks for follow-up during treatment (if no, notify treating physician):  YES.   - All questions and concerns discussed with patient in clinic today.     Jennifer Boucher, CMA

## 2023-02-22 ENCOUNTER — ALLIED HEALTH/NURSE VISIT (OUTPATIENT)
Dept: DERMATOLOGY | Facility: CLINIC | Age: 23
End: 2023-02-22
Payer: COMMERCIAL

## 2023-02-22 DIAGNOSIS — L20.9 ATOPIC DERMATITIS, UNSPECIFIED TYPE: ICD-10-CM

## 2023-02-22 PROCEDURE — 96900 ACTINOTHERAPY UV LIGHT: CPT | Performed by: DERMATOLOGY

## 2023-02-22 NOTE — PROGRESS NOTES
Crys Steel comes into clinic today at the request of Dr. Early, ordering provider for phototherapy.    This service provided today was under the supervising provider of the day Dr. Early, who was available if needed.    [facility] Dermatology Phototherapy Record  1. Cyrs Steel is a 22 year old female is here today for phototherapy (UVB) treatment for Atopic dermatitis, unspecified type [L20.9]  - Primary .        Changes or new medications since last treatment (If yes, notify MD):  NO    New medical conditions (If yes, notify MD):  NO    Any problems with last phototherapy treatment (If yes, notify MD)?  NO    Patient denies any remaining skin redness since last treatment (If no, do not treat. Do not treat red skin):  YES    Did staff apply any topicals on patient?  NO  If yes, which topical?      Did patient self apply any topicals?  NO  If yes, which topical?      The patient was offered umderuvbhandfoot or [facility] : N/A.     2. The patient tolerated phototherapy without complication.    Patient will return per protocol for next UVB treatment, per protocol.     Patient to see provider every 4-12 weeks for follow-up during treatment (if no, notify treating physician):  YES.     All questions and concerns discussed with patient in clinic today.    Tres Burch

## 2023-02-27 ENCOUNTER — ALLIED HEALTH/NURSE VISIT (OUTPATIENT)
Dept: DERMATOLOGY | Facility: CLINIC | Age: 23
End: 2023-02-27
Payer: COMMERCIAL

## 2023-02-27 DIAGNOSIS — L20.9 ATOPIC DERMATITIS, UNSPECIFIED TYPE: ICD-10-CM

## 2023-02-27 PROCEDURE — 96900 ACTINOTHERAPY UV LIGHT: CPT | Performed by: DERMATOLOGY

## 2023-02-27 NOTE — PROGRESS NOTES
Crys Steel comes into clinic today at the request of Dr Early  This service provided today was under the supervising provider of the day Dr Kilgore, who was available if needed.    [facility] Dermatology Phototherapy Record  1. Crys Steel is a 22 year old female is here today for phototherapy (UVB) treatment for Atopic dermatitis, unspecified type.        Changes or new medications since last treatment (If yes, notify MD):  NO    New medical conditions (If yes, notify MD):  NO    Any problems with last phototherapy treatment (If yes, notify MD)?  NO    Patient denies any remaining skin redness since last treatment (If no, do not treat. Do not treat red skin):  YES    Did staff apply any topicals on patient?  NO  If yes, which topical?      Did patient self apply any topicals?  NO  If yes, which topical?        2. The patient tolerated phototherapy without complication.    Patient will return per protocol for next UVB treatment, per protocol.     Patient to see provider every 4-12 weeks for follow-up during treatment (if no, notify treating physician):  YES.     All questions and concerns discussed with patient in clinic today.    aTmar Ross RN

## 2023-03-02 ENCOUNTER — ALLIED HEALTH/NURSE VISIT (OUTPATIENT)
Dept: DERMATOLOGY | Facility: CLINIC | Age: 23
End: 2023-03-02
Payer: COMMERCIAL

## 2023-03-02 DIAGNOSIS — L20.9 ATOPIC DERMATITIS, UNSPECIFIED TYPE: ICD-10-CM

## 2023-03-02 PROCEDURE — 96900 ACTINOTHERAPY UV LIGHT: CPT | Performed by: DERMATOLOGY

## 2023-03-07 ENCOUNTER — ALLIED HEALTH/NURSE VISIT (OUTPATIENT)
Dept: DERMATOLOGY | Facility: CLINIC | Age: 23
End: 2023-03-07
Payer: COMMERCIAL

## 2023-03-07 DIAGNOSIS — L20.9 ATOPIC DERMATITIS, UNSPECIFIED TYPE: ICD-10-CM

## 2023-03-07 PROCEDURE — 96900 ACTINOTHERAPY UV LIGHT: CPT | Performed by: DERMATOLOGY

## 2023-03-07 NOTE — PROGRESS NOTES
Crys Steel comes into clinic today at the request of Dr. Early, ordering provider for phototherapy.    This service provided today was under the supervising provider of the day Dr. Kilgore, who was available if needed.    [facility] Dermatology Phototherapy Record  1. Crys Steel is a 22 year old female is here today for phototherapy (UVB) treatment for Atopic dermatitis.        Changes or new medications since last treatment (If yes, notify MD):  NO    New medical conditions (If yes, notify MD):  NO    Any problems with last phototherapy treatment (If yes, notify MD)?  NO    Patient denies any remaining skin redness since last treatment (If no, do not treat. Do not treat red skin):  YES    Did staff apply any topicals on patient?  NO  If yes, which topical?      Did patient self apply any topicals?  NO  If yes, which topical?      The patient was offered umdermhuvbhandfoot or umdermhuvbfullbody AVS : N/A.     2. The patient tolerated phototherapy without complication.    Patient will return per protocol for next UVB treatment, per protocol.     Patient to see provider every 4-12 weeks for follow-up during treatment (if no, notify treating physician):  YES.     All questions and concerns discussed with patient in clinic today.    Tati Medellin RN

## 2023-03-09 ENCOUNTER — ALLIED HEALTH/NURSE VISIT (OUTPATIENT)
Dept: DERMATOLOGY | Facility: CLINIC | Age: 23
End: 2023-03-09
Payer: COMMERCIAL

## 2023-03-09 DIAGNOSIS — L20.9 ATOPIC DERMATITIS, UNSPECIFIED TYPE: ICD-10-CM

## 2023-03-09 PROCEDURE — 96900 ACTINOTHERAPY UV LIGHT: CPT | Performed by: STUDENT IN AN ORGANIZED HEALTH CARE EDUCATION/TRAINING PROGRAM

## 2023-03-09 NOTE — PROGRESS NOTES
Crys Steel comes into clinic today at the request of Dr. Early, ordering provider for phototherapy.    This service provided today was under the supervising provider of the day Dr. Polk, who was available if needed.    [facility] Dermatology Phototherapy Record  1. Crys Steel is a 22 year old female is here today for phototherapy (UVB) treatment for Atopic dermatitis.        Changes or new medications since last treatment (If yes, notify MD):  NO    New medical conditions (If yes, notify MD):  NO    Any problems with last phototherapy treatment (If yes, notify MD)?  NO    Patient denies any remaining skin redness since last treatment (If no, do not treat. Do not treat red skin):  YES    Did staff apply any topicals on patient?  NO  If yes, which topical?      Did patient self apply any topicals?  NO  If yes, which topical?      The patient was offered umdermhuvbhandfoot or umdermhuvbfullbody AVS : N/A.     2. The patient tolerated phototherapy without complication.    Patient will return per protocol for next UVB treatment, per protocol.     Patient to see provider every 4-12 weeks for follow-up during treatment (if no, notify treating physician):  YES.     All questions and concerns discussed with patient in clinic today.    Tati Medellin RN

## 2023-03-14 ENCOUNTER — ALLIED HEALTH/NURSE VISIT (OUTPATIENT)
Dept: DERMATOLOGY | Facility: CLINIC | Age: 23
End: 2023-03-14
Payer: COMMERCIAL

## 2023-03-14 DIAGNOSIS — L20.9 ATOPIC DERMATITIS, UNSPECIFIED TYPE: ICD-10-CM

## 2023-03-14 PROCEDURE — 99207 PR NO CHARGE NURSE ONLY: CPT | Performed by: DERMATOLOGY

## 2023-03-14 PROCEDURE — 96900 ACTINOTHERAPY UV LIGHT: CPT | Performed by: DERMATOLOGY

## 2023-03-14 NOTE — PROGRESS NOTES
Crys Steel comes into clinic today at the request of Dr. Early, ordering provider for phototherapy.    This service provided today was under the supervising provider of the day Dr. Kilgore, who was available if needed.    [facility] Dermatology Phototherapy Record  1. Crys Steel is a 22 year old female is here today for phototherapy (UVB) treatment for Atopic dermatitis, unspecified type [L20.9].        Changes or new medications since last treatment (If yes, notify MD):  NO    New medical conditions (If yes, notify MD):  NO    Any problems with last phototherapy treatment (If yes, notify MD)?  NO    Patient denies any remaining skin redness since last treatment (If no, do not treat. Do not treat red skin):  YES    Did staff apply any topicals on patient?  NO  If yes, which topical?      Did patient self apply any topicals?  NO  If yes, which topical?      The patient was offered umdermhuvbhandfoot or umdermhuvbfullbody AVS : N/A.     2. The patient tolerated phototherapy without complication.    Patient will return per protocol for next UVB treatment, per protocol.     Patient to see provider every 4-12 weeks for follow-up during treatment (if no, notify treating physician):  YES.     All questions and concerns discussed with patient in clinic today.    Bekah Angela RN

## 2023-03-16 ENCOUNTER — ALLIED HEALTH/NURSE VISIT (OUTPATIENT)
Dept: DERMATOLOGY | Facility: CLINIC | Age: 23
End: 2023-03-16
Payer: COMMERCIAL

## 2023-03-16 DIAGNOSIS — L20.9 ATOPIC DERMATITIS, UNSPECIFIED TYPE: ICD-10-CM

## 2023-03-16 PROCEDURE — 99207 PR NO CHARGE NURSE ONLY: CPT | Performed by: STUDENT IN AN ORGANIZED HEALTH CARE EDUCATION/TRAINING PROGRAM

## 2023-03-16 PROCEDURE — 96900 ACTINOTHERAPY UV LIGHT: CPT | Performed by: STUDENT IN AN ORGANIZED HEALTH CARE EDUCATION/TRAINING PROGRAM

## 2023-03-16 NOTE — PROGRESS NOTES
Crys Steel comes into clinic today at the request of Dr. Early, ordering provider for phototherapy.    This service provided today was under the supervising provider of the day Dr. Pokl, who was available if needed.    [facility] Dermatology Phototherapy Record  1. Crys Steel is a 22 year old female is here today for phototherapy (UVB) treatment for Atopic dermatitis.        Changes or new medications since last treatment (If yes, notify MD):  NO    New medical conditions (If yes, notify MD):  NO    Any problems with last phototherapy treatment (If yes, notify MD)?  NO    Patient denies any remaining skin redness since last treatment (If no, do not treat. Do not treat red skin):  YES    Did staff apply any topicals on patient?  NO  If yes, which topical?      Did patient self apply any topicals?  NO  If yes, which topical?      The patient was offered umdermhuvbhandfoot or umdermhuvbfullbody AVS : N/A.     2. The patient tolerated phototherapy without complication.    Patient will return per protocol for next UVB treatment, per protocol.     Patient to see provider every 4-12 weeks for follow-up during treatment (if no, notify treating physician):  YES.     All questions and concerns discussed with patient in clinic today.    Tati Medellin RN

## 2023-03-21 ENCOUNTER — ALLIED HEALTH/NURSE VISIT (OUTPATIENT)
Dept: DERMATOLOGY | Facility: CLINIC | Age: 23
End: 2023-03-21
Payer: COMMERCIAL

## 2023-03-21 DIAGNOSIS — L20.9 ATOPIC DERMATITIS, UNSPECIFIED TYPE: ICD-10-CM

## 2023-03-21 PROCEDURE — 96900 ACTINOTHERAPY UV LIGHT: CPT | Performed by: DERMATOLOGY

## 2023-03-21 PROCEDURE — 99207 PR NO CHARGE NURSE ONLY: CPT | Performed by: DERMATOLOGY

## 2023-03-21 NOTE — PROGRESS NOTES
Crys Steel comes into clinic today at the request of Dr Early, ordering provider for phototherapy.    This service provided today was under the supervising provider of the day Dr Estrada, who was available if needed.    [facility] Dermatology Phototherapy Record  1. Crys Steel is a 22 year old female is here today for phototherapy (UVB) treatment for Atopic dermatitis, unspecified type.        Changes or new medications since last treatment (If yes, notify MD):  NO    New medical conditions (If yes, notify MD):  NO    Any problems with last phototherapy treatment (If yes, notify MD)?  NO    Patient denies any remaining skin redness since last treatment (If no, do not treat. Do not treat red skin):  YES    Did staff apply any topicals on patient?  NO  If yes, which topical?      Did patient self apply any topicals?  NO  If yes, which topical?        2. The patient tolerated phototherapy without complication.    Patient will return per protocol for next UVB treatment, per protocol.     Patient to see provider every 4-12 weeks for follow-up during treatment (if no, notify treating physician):  YES.     All questions and concerns discussed with patient in clinic today.    Tamar Ross RN

## 2023-03-23 ENCOUNTER — ALLIED HEALTH/NURSE VISIT (OUTPATIENT)
Dept: DERMATOLOGY | Facility: CLINIC | Age: 23
End: 2023-03-23
Payer: COMMERCIAL

## 2023-03-23 DIAGNOSIS — L20.9 ATOPIC DERMATITIS, UNSPECIFIED TYPE: ICD-10-CM

## 2023-03-23 PROCEDURE — 99207 PR NO CHARGE NURSE ONLY: CPT | Performed by: DERMATOLOGY

## 2023-03-23 PROCEDURE — 96900 ACTINOTHERAPY UV LIGHT: CPT | Performed by: DERMATOLOGY

## 2023-03-23 NOTE — PROGRESS NOTES
Crys Steel comes into clinic today at the request of Dr. Early, ordering provider for phototherapy.     This service provided today was under the supervising provider of the day Dr. Watts, who was available if needed.     [facility] Dermatology Phototherapy Record  1. Crys Steel is a 22 year old female is here today for phototherapy (NBUVB) treatment for Atopic dermatitis.         Changes or new medications since last treatment (If yes, notify MD):  NO    New medical conditions (If yes, notify MD):  NO    Any problems with last phototherapy treatment (If yes, notify MD)?  NO    Patient denies any remaining skin redness since last treatment (If no, do not treat. Do not treat red skin):  YES    Did staff apply any topicals on patient?  NO      Did patient self apply any topicals?  NO       2. The patient tolerated phototherapy without complication.  - Patient will return per protocol for next NBUVB treatment, per protocol.   - Patient to see provider every 4-12 weeks for follow-up during treatment (if no, notify treating physician):  YES.   - All questions and concerns discussed with patient in clinic today.     Jennifer Boucher, CMA

## 2023-03-28 ENCOUNTER — ALLIED HEALTH/NURSE VISIT (OUTPATIENT)
Dept: DERMATOLOGY | Facility: CLINIC | Age: 23
End: 2023-03-28
Payer: COMMERCIAL

## 2023-03-28 DIAGNOSIS — L20.9 ATOPIC DERMATITIS, UNSPECIFIED TYPE: ICD-10-CM

## 2023-03-28 PROCEDURE — 96900 ACTINOTHERAPY UV LIGHT: CPT | Performed by: DERMATOLOGY

## 2023-03-28 PROCEDURE — 99207 PR NO CHARGE NURSE ONLY: CPT | Performed by: DERMATOLOGY

## 2023-03-28 NOTE — PROGRESS NOTES
Crys Steel comes into clinic today at the request of Dr Early, ordering provider for phototherapy.    This service provided today was under the supervising provider of the day Dr Estrada, who was available if needed.    [facility] Dermatology Phototherapy Record  1. Crys Steel is a 22 year old female is here today for phototherapy (UVB) treatment for Atopic dermatitis, unspecified type.        Changes or new medications since last treatment (If yes, notify MD):  NO    New medical conditions (If yes, notify MD):  NO    Any problems with last phototherapy treatment (If yes, notify MD)?  NO    Patient denies any remaining skin redness since last treatment (If no, do not treat. Do not treat red skin):  YES    Did staff apply any topicals on patient?  NO  If yes, which topical?      Did patient self apply any topicals?  NO  If yes, which topical?        2. The patient tolerated phototherapy without complication.    Patient will return per protocol for next UVB treatment, per protocol.     Patient to see provider every 4-12 weeks for follow-up during treatment (if no, notify treating physician):  YES.     All questions and concerns discussed with patient in clinic today.    Tamar Ross RN

## 2023-03-30 ENCOUNTER — ALLIED HEALTH/NURSE VISIT (OUTPATIENT)
Dept: DERMATOLOGY | Facility: CLINIC | Age: 23
End: 2023-03-30
Payer: COMMERCIAL

## 2023-03-30 DIAGNOSIS — L20.9 ATOPIC DERMATITIS, UNSPECIFIED TYPE: ICD-10-CM

## 2023-03-30 PROCEDURE — 96900 ACTINOTHERAPY UV LIGHT: CPT | Performed by: DERMATOLOGY

## 2023-03-30 PROCEDURE — 99207 PR NO CHARGE NURSE ONLY: CPT | Performed by: DERMATOLOGY

## 2023-03-30 NOTE — NURSING NOTE
.Crys Steel comes into clinic today at the request of Dr. Saba, ordering provider for phototherapy.    This service provided today was under the supervising provider of the day Dr. Watts, who was available if needed.    [facility] Dermatology Phototherapy Record  1. Crys Steel is a 22 year old female is here today for phototherapy (UVB) treatment for atopic dermatitis.        Changes or new medications since last treatment (If yes, notify MD):  NO    New medical conditions (If yes, notify MD):  NO    Any problems with last phototherapy treatment (If yes, notify MD)?  NO    Patient denies any remaining skin redness since last treatment (If no, do not treat. Do not treat red skin):  YES    Did staff apply any topicals on patient?  NO  If yes, which topical?      Did patient self apply any topicals?  NO  If yes, which topical?      The patient was offered umdermhuvbhandfoot or umdermhuvbfullbody AVS : YES.     2. The patient tolerated phototherapy without complication.    Patient will return per protocol for next UVB treatment, per protocol.     Patient to see provider every 4-12 weeks for follow-up during treatment (if no, notify treating physician):  YES.     All questions and concerns discussed with patient in clinic today.    Cornelia Esteban RN

## 2023-04-04 ENCOUNTER — ALLIED HEALTH/NURSE VISIT (OUTPATIENT)
Dept: DERMATOLOGY | Facility: CLINIC | Age: 23
End: 2023-04-04
Payer: COMMERCIAL

## 2023-04-04 DIAGNOSIS — L20.9 ATOPIC DERMATITIS, UNSPECIFIED TYPE: ICD-10-CM

## 2023-04-04 PROCEDURE — 99207 PR NO CHARGE NURSE ONLY: CPT | Performed by: DERMATOLOGY

## 2023-04-04 PROCEDURE — 96900 ACTINOTHERAPY UV LIGHT: CPT | Performed by: DERMATOLOGY

## 2023-04-04 NOTE — NURSING NOTE
Crys Steel comes into clinic today at the request of Dr. Saba, ordering provider for phototherapy.    This service provided today was under the supervising provider of the day Dr. Kilgore, who was available if needed.    [facility] Dermatology Phototherapy Record  1. Crys Steel is a 22 year old female is here today for phototherapy (UVB) treatment for Atopic dermatitis.        Changes or new medications since last treatment (If yes, notify MD):  NO    New medical conditions (If yes, notify MD):  NO    Any problems with last phototherapy treatment (If yes, notify MD)?  NO    Patient denies any remaining skin redness since last treatment (If no, do not treat. Do not treat red skin):  YES    Did staff apply any topicals on patient?  NO  If yes, which topical?      Did patient self apply any topicals?  NO  If yes, which topical?      2. The patient tolerated phototherapy without complication.    Patient will return per protocol for next UVB treatment, per protocol.     Patient to see provider every 4-12 weeks for follow-up during treatment (if no, notify treating physician):  YES.     All questions and concerns discussed with patient in clinic today.    Cornelia Esteban RN

## 2023-04-06 ENCOUNTER — ALLIED HEALTH/NURSE VISIT (OUTPATIENT)
Dept: DERMATOLOGY | Facility: CLINIC | Age: 23
End: 2023-04-06
Payer: COMMERCIAL

## 2023-04-06 DIAGNOSIS — L20.9 ATOPIC DERMATITIS, UNSPECIFIED TYPE: ICD-10-CM

## 2023-04-06 PROCEDURE — 96900 ACTINOTHERAPY UV LIGHT: CPT | Performed by: DERMATOLOGY

## 2023-04-06 PROCEDURE — 99207 PR NO CHARGE NURSE ONLY: CPT | Performed by: DERMATOLOGY

## 2023-04-06 NOTE — PROGRESS NOTES
Crys Steel comes into clinic today at the request of Dr. Early, ordering provider for phototherapy.    This service provided today was under the supervising provider of the day Dr. Kilgore, who was available if needed.    [facility] Dermatology Phototherapy Record  1. Crys Steel is a 22 year old female is here today for phototherapy (UVB) treatment for Atopic dermatitis, unspecified type [L20.9].        Changes or new medications since last treatment (If yes, notify MD):  NO    New medical conditions (If yes, notify MD):  NO    Any problems with last phototherapy treatment (If yes, notify MD)?  NO    Patient denies any remaining skin redness since last treatment (If no, do not treat. Do not treat red skin):  YES    Did staff apply any topicals on patient?  NO  If yes, which topical?      Did patient self apply any topicals?  NO  If yes, which topical?      The patient was offered umdermhuvbhandfoot or umdermhuvbfullbody AVS : N/A.     2. The patient tolerated phototherapy without complication.    Patient will return per protocol for next UVB treatment, per protocol.     Patient to see provider every 4-12 weeks for follow-up during treatment (if no, notify treating physician):  YES.     All questions and concerns discussed with patient in clinic today.    Bekah Angela RN

## 2023-04-11 ENCOUNTER — ALLIED HEALTH/NURSE VISIT (OUTPATIENT)
Dept: DERMATOLOGY | Facility: CLINIC | Age: 23
End: 2023-04-11
Payer: COMMERCIAL

## 2023-04-11 DIAGNOSIS — L20.9 ATOPIC DERMATITIS, UNSPECIFIED TYPE: ICD-10-CM

## 2023-04-11 PROCEDURE — 99207 PR NO CHARGE NURSE ONLY: CPT | Performed by: DERMATOLOGY

## 2023-04-11 PROCEDURE — 96900 ACTINOTHERAPY UV LIGHT: CPT | Performed by: DERMATOLOGY

## 2023-04-11 NOTE — PROGRESS NOTES
Crys Steel comes into clinic today at the request of Dr Early, ordering provider for phototherapy.    This service provided today was under the supervising provider of the day Dr Kilgore, who was available if needed.    [facility] Dermatology Phototherapy Record  1. Crys Steel is a 22 year old female is here today for phototherapy (UVB) treatment for Atopic dermatitis, unspecified type.        Changes or new medications since last treatment (If yes, notify MD):  NO    New medical conditions (If yes, notify MD):  NO    Any problems with last phototherapy treatment (If yes, notify MD)?  NO    Patient denies any remaining skin redness since last treatment (If no, do not treat. Do not treat red skin):  YES    Did staff apply any topicals on patient?  NO  If yes, which topical?      Did patient self apply any topicals?  NO  If yes, which topical?      2. The patient tolerated phototherapy without complication.    Patient will return per protocol for next UVB treatment, per protocol.     Patient to see provider every 4-12 weeks for follow-up during treatment (if no, notify treating physician):  YES.     All questions and concerns discussed with patient in clinic today.    Tamar Ross RN

## 2023-04-13 ENCOUNTER — ALLIED HEALTH/NURSE VISIT (OUTPATIENT)
Dept: DERMATOLOGY | Facility: CLINIC | Age: 23
End: 2023-04-13
Payer: COMMERCIAL

## 2023-04-13 DIAGNOSIS — L20.9 ATOPIC DERMATITIS, UNSPECIFIED TYPE: ICD-10-CM

## 2023-04-13 PROCEDURE — 96900 ACTINOTHERAPY UV LIGHT: CPT | Performed by: DERMATOLOGY

## 2023-04-13 PROCEDURE — 99207 PR NO CHARGE NURSE ONLY: CPT | Performed by: DERMATOLOGY

## 2023-04-13 NOTE — PROGRESS NOTES
Crys Steel comes into clinic today at the request of Dr. Early, ordering provider for phototherapy.    This service provided today was under the supervising provider of the day Dr. Kilgore, who was available if needed.    [facility] Dermatology Phototherapy Record  1. Crys Steel is a 22 year old female is here today for phototherapy (UVB) treatment for Atopic dermatitis.        Changes or new medications since last treatment (If yes, notify MD):  NO    New medical conditions (If yes, notify MD):  NO    Any problems with last phototherapy treatment (If yes, notify MD)?  NO    Patient denies any remaining skin redness since last treatment (If no, do not treat. Do not treat red skin):  YES    Did staff apply any topicals on patient?  NO  If yes, which topical?      Did patient self apply any topicals?  NO  If yes, which topical?      The patient was offered umdermhuvbhandfoot or umdermhuvbfullbody AVS : N/A.     2. The patient tolerated phototherapy without complication.    Patient will return per protocol for next UVB treatment, per protocol.     Patient to see provider every 4-12 weeks for follow-up during treatment (if no, notify treating physician):  YES.     All questions and concerns discussed with patient in clinic today.    Tati Medellin RN

## 2023-04-18 ENCOUNTER — ALLIED HEALTH/NURSE VISIT (OUTPATIENT)
Dept: DERMATOLOGY | Facility: CLINIC | Age: 23
End: 2023-04-18
Payer: COMMERCIAL

## 2023-04-18 DIAGNOSIS — L20.9 ATOPIC DERMATITIS, UNSPECIFIED TYPE: ICD-10-CM

## 2023-04-18 PROCEDURE — 96900 ACTINOTHERAPY UV LIGHT: CPT | Performed by: DERMATOLOGY

## 2023-04-18 PROCEDURE — 99207 PR NO CHARGE NURSE ONLY: CPT | Performed by: DERMATOLOGY

## 2023-04-18 NOTE — PROGRESS NOTES
Crys Steel comes into clinic today at the request of Dr Early, ordering provider for phototherapy.    This service provided today was under the supervising provider of the day Dr Kilgore, who was available if needed.    [facility] Dermatology Phototherapy Record  1. Crys Steel is a 22 year old female is here today for phototherapy (UVB) treatment for Atopic dermatitis, unspecified type.        Changes or new medications since last treatment (If yes, notify MD):  NO    New medical conditions (If yes, notify MD):  NO    Any problems with last phototherapy treatment (If yes, notify MD)?  NO    Patient denies any remaining skin redness since last treatment (If no, do not treat. Do not treat red skin):  YES    Did staff apply any topicals on patient?  NO  If yes, which topical?      Did patient self apply any topicals?  NO  If yes, which topical?         2. The patient tolerated phototherapy without complication.    Patient will return per protocol for next UVB treatment, per protocol.     Patient to see provider every 4-12 weeks for follow-up during treatment (if no, notify treating physician):  YES.     All questions and concerns discussed with patient in clinic today.    Tamar Ross RN

## 2023-04-20 ENCOUNTER — ALLIED HEALTH/NURSE VISIT (OUTPATIENT)
Dept: DERMATOLOGY | Facility: CLINIC | Age: 23
End: 2023-04-20
Payer: COMMERCIAL

## 2023-04-20 DIAGNOSIS — L20.9 ATOPIC DERMATITIS, UNSPECIFIED TYPE: ICD-10-CM

## 2023-04-20 PROCEDURE — 96900 ACTINOTHERAPY UV LIGHT: CPT | Performed by: DERMATOLOGY

## 2023-04-20 PROCEDURE — 99207 PR NO CHARGE NURSE ONLY: CPT | Performed by: DERMATOLOGY

## 2023-04-20 NOTE — PROGRESS NOTES
Crys Steel comes into clinic today at the request of Dr. Early, ordering provider for phototherapy.    This service provided today was under the supervising provider of the day Dr. Kilgore, who was available if needed.    [facility] Dermatology Phototherapy Record  1. Crys Steel is a 22 year old female is here today for phototherapy (UVB) treatment for Atopic dermatitis.        Changes or new medications since last treatment (If yes, notify MD):  NO    New medical conditions (If yes, notify MD):  NO    Any problems with last phototherapy treatment (If yes, notify MD)?  NO    Patient denies any remaining skin redness since last treatment (If no, do not treat. Do not treat red skin):  YES    Did staff apply any topicals on patient?  NO  If yes, which topical?      Did patient self apply any topicals?  NO  If yes, which topical?      The patient was offered umdermhuvbhandfoot or umdermhuvbfullbody AVS : N/A.     2. The patient tolerated phototherapy without complication.    Patient will return per protocol for next UVB treatment, per protocol.     Patient to see provider every 4-12 weeks for follow-up during treatment (if no, notify treating physician):  YES.     All questions and concerns discussed with patient in clinic today.    Tati Medellin RN

## 2023-04-25 ENCOUNTER — ALLIED HEALTH/NURSE VISIT (OUTPATIENT)
Dept: DERMATOLOGY | Facility: CLINIC | Age: 23
End: 2023-04-25
Payer: COMMERCIAL

## 2023-04-25 DIAGNOSIS — L20.9 ATOPIC DERMATITIS, UNSPECIFIED TYPE: ICD-10-CM

## 2023-04-25 PROCEDURE — 96900 ACTINOTHERAPY UV LIGHT: CPT | Performed by: DERMATOLOGY

## 2023-04-25 PROCEDURE — 99207 PR NO CHARGE NURSE ONLY: CPT | Performed by: DERMATOLOGY

## 2023-04-25 NOTE — PROGRESS NOTES
Crys Steel comes into clinic today at the request of Dr Early, ordering provider for phototherapy.    This service provided today was under the supervising provider of the day Dr Kilgore, who was available if needed.    [facility] Dermatology Phototherapy Record  1. Crys Steel is a 22 year old female is here today for phototherapy (UVB) treatment for Atopic dermatitis, unspecified type.        Changes or new medications since last treatment (If yes, notify MD):  NO    New medical conditions (If yes, notify MD):  NO    Any problems with last phototherapy treatment (If yes, notify MD)?  NO    Patient denies any remaining skin redness since last treatment (If no, do not treat. Do not treat red skin):  YES    Did staff apply any topicals on patient?  NO  If yes, which topical?      Did patient self apply any topicals?  NO  If yes, which topical?        2. The patient tolerated phototherapy without complication.    Patient will return per protocol for next UVB treatment, per protocol.     Patient to see provider every 4-12 weeks for follow-up during treatment (if no, notify treating physician):  YES.     All questions and concerns discussed with patient in clinic today.    Tamar Ross RN

## 2023-04-27 ENCOUNTER — ALLIED HEALTH/NURSE VISIT (OUTPATIENT)
Dept: DERMATOLOGY | Facility: CLINIC | Age: 23
End: 2023-04-27
Payer: COMMERCIAL

## 2023-04-27 DIAGNOSIS — L20.9 ATOPIC DERMATITIS, UNSPECIFIED TYPE: ICD-10-CM

## 2023-04-27 PROCEDURE — 99207 PR NO CHARGE NURSE ONLY: CPT | Performed by: STUDENT IN AN ORGANIZED HEALTH CARE EDUCATION/TRAINING PROGRAM

## 2023-04-27 PROCEDURE — 96900 ACTINOTHERAPY UV LIGHT: CPT | Performed by: STUDENT IN AN ORGANIZED HEALTH CARE EDUCATION/TRAINING PROGRAM

## 2023-04-27 NOTE — PROGRESS NOTES
Crys Steel comes into clinic today at the request of Dr. Early, ordering provider for phototherapy.    This service provided today was under the supervising provider of the day Dr. Polk, who was available if needed.    [facility] Dermatology Phototherapy Record  1. Crys Steel is a 22 year old female is here today for phototherapy (UVB) treatment for Atopic dermatitis.        Changes or new medications since last treatment (If yes, notify MD):  NO    New medical conditions (If yes, notify MD):  NO    Any problems with last phototherapy treatment (If yes, notify MD)?  NO    Patient denies any remaining skin redness since last treatment (If no, do not treat. Do not treat red skin):  YES    Did staff apply any topicals on patient?  NO  If yes, which topical?      Did patient self apply any topicals?  NO  If yes, which topical?      The patient was offered umdermhuvbhandfoot or umdermhuvbfullbody AVS : N/A.     2. The patient tolerated phototherapy without complication.    Patient will return per protocol for next UVB treatment, per protocol.     Patient to see provider every 4-12 weeks for follow-up during treatment (if no, notify treating physician):  YES.     All questions and concerns discussed with patient in clinic today.    Tati Medellin RN

## 2023-05-02 ENCOUNTER — ALLIED HEALTH/NURSE VISIT (OUTPATIENT)
Dept: DERMATOLOGY | Facility: CLINIC | Age: 23
End: 2023-05-02
Payer: COMMERCIAL

## 2023-05-02 DIAGNOSIS — L20.9 ATOPIC DERMATITIS, UNSPECIFIED TYPE: ICD-10-CM

## 2023-05-02 PROCEDURE — 96900 ACTINOTHERAPY UV LIGHT: CPT | Performed by: DERMATOLOGY

## 2023-05-02 PROCEDURE — 99207 PR NO CHARGE NURSE ONLY: CPT | Performed by: DERMATOLOGY

## 2023-05-02 NOTE — PROGRESS NOTES
Crys Steel comes into clinic today at the request of Dr. Early, ordering provider for phototherapy.    This service provided today was under the supervising provider of the day Dr. Kilgore, who was available if needed.    [facility] Dermatology Phototherapy Record  1. Crys Steel is a 22 year old female is here today for phototherapy (UVB) treatment for Atopic dermatitis, unspecified type [L20.9]  - Primary .        Changes or new medications since last treatment (If yes, notify MD):  NO    New medical conditions (If yes, notify MD):  NO    Any problems with last phototherapy treatment (If yes, notify MD)?  NO    Patient denies any remaining skin redness since last treatment (If no, do not treat. Do not treat red skin):  YES    Did staff apply any topicals on patient?  NO  If yes, which topical?      Did patient self apply any topicals?  NO  If yes, which topical?      The patient was offered umderuvbhandfoot or deruvbfullbody AVS : N/A.     2. The patient tolerated phototherapy without complication.    Patient will return per protocol for next UVB treatment, per protocol.     Patient to see provider every 4-12 weeks for follow-up during treatment (if no, notify treating physician):  YES.     All questions and concerns discussed with patient in clinic today.    Tres Burch

## 2023-05-04 ENCOUNTER — ALLIED HEALTH/NURSE VISIT (OUTPATIENT)
Dept: DERMATOLOGY | Facility: CLINIC | Age: 23
End: 2023-05-04
Payer: COMMERCIAL

## 2023-05-04 DIAGNOSIS — L20.9 ATOPIC DERMATITIS, UNSPECIFIED TYPE: ICD-10-CM

## 2023-05-04 PROCEDURE — 99207 PR NO CHARGE NURSE ONLY: CPT | Performed by: DERMATOLOGY

## 2023-05-04 PROCEDURE — 96900 ACTINOTHERAPY UV LIGHT: CPT | Performed by: DERMATOLOGY

## 2023-05-04 ASSESSMENT — PAIN SCALES - GENERAL: PAINLEVEL: NO PAIN (0)

## 2023-05-04 NOTE — PROGRESS NOTES
Crys Steel comes into clinic today at the request of Dr. Early, ordering provider for phototherapy.    This service provided today was under the supervising provider of the day Dr. Kilgore, who was available if needed.    [facility] Dermatology Phototherapy Record  1. Crys Steel is a 22 year old female is here today for phototherapy (UVB) treatment forAtopic dermatitis, unspecified type [L20.9]  - Primary  .        Changes or new medications since last treatment (If yes, notify MD):  NO    New medical conditions (If yes, notify MD):  NO    Any problems with last phototherapy treatment (If yes, notify MD)?  NO    Patient denies any remaining skin redness since last treatment (If no, do not treat. Do not treat red skin):  YES    Did staff apply any topicals on patient?  NO  If yes, which topical?      Did patient self apply any topicals?  NO  If yes, which topical?      The patient was offered umderuvbhandfoot or deruvbfullbody AVS : N/A.     2. The patient tolerated phototherapy without complication.    Patient will return per protocol for next UVB treatment, per protocol.     Patient to see provider every 4-12 weeks for follow-up during treatment (if no, notify treating physician):  YES.     All questions and concerns discussed with patient in clinic today.    Ashley Sharma, CMA

## 2023-05-04 NOTE — NURSING NOTE
Chief Complaint   Patient presents with     Phototherapy     Atopic dermatitis, unspecified type [L20.9]  - Primary      Ashley MORA CMA

## 2023-05-09 ENCOUNTER — ALLIED HEALTH/NURSE VISIT (OUTPATIENT)
Dept: DERMATOLOGY | Facility: CLINIC | Age: 23
End: 2023-05-09
Payer: COMMERCIAL

## 2023-05-09 DIAGNOSIS — L20.9 ATOPIC DERMATITIS, UNSPECIFIED TYPE: ICD-10-CM

## 2023-05-09 PROCEDURE — 96900 ACTINOTHERAPY UV LIGHT: CPT | Performed by: DERMATOLOGY

## 2023-05-09 PROCEDURE — 99207 PR NO CHARGE NURSE ONLY: CPT | Performed by: DERMATOLOGY

## 2023-05-09 NOTE — PROGRESS NOTES
Crys Steel comes into clinic today at the request of Dr. son, ordering provider for phototherapy.    This service provided today was under the supervising provider of the day Dr. Kilgore, who was available if needed.    [facility] Dermatology Phototherapy Record  1. Crys Steel is a 22 year old female is here today for phototherapy (UVB) treatment for Atopic dermatitis, unspecified type [L20.9]  - Primary .        Changes or new medications since last treatment (If yes, notify MD):  NO    New medical conditions (If yes, notify MD):  NO    Any problems with last phototherapy treatment (If yes, notify MD)?  NO    Patient denies any remaining skin redness since last treatment (If no, do not treat. Do not treat red skin):  YES    Did staff apply any topicals on patient?  NO  If yes, which topical?      Did patient self apply any topicals?  NO  If yes, which topical?      The patient was offered umderuvbhandfoot or deruvbfullbody AVS : N/A.     2. The patient tolerated phototherapy without complication.    Patient will return per protocol for next UVB treatment, per protocol.     Patient to see provider every 4-12 weeks for follow-up during treatment (if no, notify treating physician):  YES.     All questions and concerns discussed with patient in clinic today.    Tres Burch

## 2023-05-11 ENCOUNTER — ALLIED HEALTH/NURSE VISIT (OUTPATIENT)
Dept: DERMATOLOGY | Facility: CLINIC | Age: 23
End: 2023-05-11
Payer: COMMERCIAL

## 2023-05-11 DIAGNOSIS — L20.9 ATOPIC DERMATITIS, UNSPECIFIED TYPE: ICD-10-CM

## 2023-05-11 PROCEDURE — 99207 PR NO CHARGE NURSE ONLY: CPT | Performed by: DERMATOLOGY

## 2023-05-11 PROCEDURE — 96900 ACTINOTHERAPY UV LIGHT: CPT | Performed by: DERMATOLOGY

## 2023-05-11 NOTE — PROGRESS NOTES
Crys Steel comes into clinic today at the request of Dr. Early, ordering provider for phototherapy.    This service provided today was under the supervising provider of the day Dr. Kilgore, who was available if needed.    [facility] Dermatology Phototherapy Record  1. Crys Steel is a 22 year old female is here today for phototherapy (UVB) treatment for Atopic dermatitis.        Changes or new medications since last treatment (If yes, notify MD):  NO    New medical conditions (If yes, notify MD):  NO    Any problems with last phototherapy treatment (If yes, notify MD)?  NO    Patient denies any remaining skin redness since last treatment (If no, do not treat. Do not treat red skin):  YES    Did staff apply any topicals on patient?  NO  If yes, which topical?      Did patient self apply any topicals?  NO  If yes, which topical?      The patient was offered umdermhuvbhandfoot or umdermhuvbfullbody AVS : N/A.     2. The patient tolerated phototherapy without complication.    Patient will return per protocol for next UVB treatment, per protocol.     Patient to see provider every 4-12 weeks for follow-up during treatment (if no, notify treating physician):  YES.     All questions and concerns discussed with patient in clinic today.    Tati Medellin RN

## 2023-05-24 NOTE — PROGRESS NOTES
[facility] Dermatology Note  Encounter Date: May 25, 2023  Office Visit     Dermatology Problem List:  1. Intrinsic Atopic dermatitis  - face TAC 0.025%, body TAC 0.1%, mupirocin for impetiginized areas on face, gentle skin cares, nbUVB started 11/30/22, Opzelura  - previous: failed protopic, dupixent (started 8/2021: patient stopped 12/2021 because she did not want to inject herself)  - patient does not want to pursue or injectables or systemic immunomodulation      2. Contact sensitization to potassium dichromate, methyl dibromo glutaronitrile, propolis, and irritant reactions to fragrance mix/cinnamic alcohol and benzoyl peroxide      3. Positive atopy screen with immediate type reactions to house dust mites and molds    ____________________________________________    Assessment & Plan:    # Severe atopic dermatitis. Chronic, flare/not yet at goal.   Improving on nbUVB, but still with involvement on extremities and face. Will plan to continue nbUVB phototherapy and start opzelura as adjuvant topical therapy.  - Continue bleach baths 2 days per week.   - Start Opzelura 1.5% cream pending insurance approval  - Continue nbUVB 2x per week  - Future considerations: oral OLRA inhibitor    Procedures Performed:   None    Follow-up: 4 month(s) in-person, or earlier for new or changing lesions    Staff and Scribe:     Scribe Disclosure:  I, Bobby Shukla, am serving as a scribe to document services personally performed by Alejandro Early MD based on data collection and the provider's statements to me.     Provider Disclosure:   The documentation recorded by the scribe accurately reflects the services I personally performed and the decisions made by me.    Alejandro Early MD    Department of Dermatology  [facility]: Phone: [phone], Fax:[phone]  [facility]: Phone:  [phone] Fax: [phone]  ____________________________________________    CC: Eczema (Pt following up after starting light therapy for eczema about 4 months ago.)    HPI:  Ms. Crys Steel is a(n) 22 year old female who presents today as a return patient for follow-up. Last seen by me on 11/30/2022, at which time patient was started on nbUVB for treatment of severe atopic dermatitis.    Today, patient reports that her dermatitis has been improved. She describes it as her skin feeling stronger and having more of a barrier. She has still been itchy. She has been mostly avoiding topicals, but she has needed to use them occasionally.    Patient is otherwise feeling well, without additional skin concerns.    Labs Reviewed:  N/A    Physical Exam:  Vitals: There were no vitals taken for this visit.  SKIN: Sun-exposed skin, which includes the head/face, neck, both arms, digits, and/or nails was examined.    - Scattered pink scaly thin plaques on the face, antecubital fossa, lower extremities. Improved from prior.  - No other lesions of concern on areas examined.     Medications:  Current Outpatient Medications   Medication     ALEJANDRO FE 1/20 1-20 MG-MCG tablet     mupirocin (BACTROBAN) 2 % external ointment     tacrolimus (PROTOPIC) 0.03 % external ointment     tacrolimus (PROTOPIC) 0.03 % external ointment     triamcinolone (KENALOG) 0.025 % external ointment     triamcinolone (KENALOG) 0.025 % external ointment     triamcinolone (KENALOG) 0.1 % external ointment     No current facility-administered medications for this visit.      Past Medical History:   There is no problem list on file for this patient.    History reviewed. No pertinent past medical history.

## 2023-05-25 ENCOUNTER — OFFICE VISIT (OUTPATIENT)
Dept: DERMATOLOGY | Facility: CLINIC | Age: 23
End: 2023-05-25
Payer: COMMERCIAL

## 2023-05-25 DIAGNOSIS — L20.9 ATOPIC DERMATITIS, UNSPECIFIED TYPE: Primary | ICD-10-CM

## 2023-05-25 PROCEDURE — 99214 OFFICE O/P EST MOD 30 MIN: CPT | Performed by: DERMATOLOGY

## 2023-05-25 RX ORDER — RUXOLITINIB 15 MG/G
1 CREAM TOPICAL 2 TIMES DAILY
Qty: 60 G | Refills: 11 | Status: SHIPPED | OUTPATIENT
Start: 2023-05-25

## 2023-05-25 ASSESSMENT — PAIN SCALES - GENERAL: PAINLEVEL: NO PAIN (0)

## 2023-05-25 NOTE — LETTER
5/25/2023       RE: Crys Steel  [id number] [facility] [address]     Dear Colleague,    Thank you for referring your patient, Crys Steel, to the [facility] [place] [facility]. Please see a copy of my visit note below.    [facility] Dermatology Note  Encounter Date: May 25, 2023  Office Visit     Dermatology Problem List:  1. Intrinsic Atopic dermatitis  - face TAC 0.025%, body TAC 0.1%, mupirocin for impetiginized areas on face, gentle skin cares, nbUVB started 11/30/22, Opzelura  - previous: failed protopic, dupixent (started 8/2021: patient stopped 12/2021 because she did not want to inject herself)  - patient does not want to pursue or injectables or systemic immunomodulation      2. Contact sensitization to potassium dichromate, methyl dibromo glutaronitrile, propolis, and irritant reactions to fragrance mix/cinnamic alcohol and benzoyl peroxide      3. Positive atopy screen with immediate type reactions to house dust mites and molds    ____________________________________________    Assessment & Plan:    # Severe atopic dermatitis. Chronic, flare/not yet at goal.   Improving on nbUVB, but still with involvement on extremities and face. Will plan to continue nbUVB phototherapy and start opzelura as adjuvant topical therapy.  - Continue bleach baths 2 days per week.   - Start Opzelura 1.5% cream pending insurance approval  - Continue nbUVB 2x per week  - Future considerations: oral LORA inhibitor    Procedures Performed:   None    Follow-up: 4 month(s) in-person, or earlier for new or changing lesions    Staff and Scribe:     Scribe Disclosure:  I, Bobby Shukla, am serving as a scribe to document services personally performed by Alejandro Early MD based on data collection and the provider's statements to me.     Provider Disclosure:   The documentation recorded by the  bahman accurately reflects the services I personally performed and the decisions made by me.    Alejandro Early MD    Department of Dermatology  [facility]: Phone: [phone], Fax:[phone]  [facility]: Phone: [phone] Fax: [phone]  ____________________________________________    CC: Eczema (Pt following up after starting light therapy for eczema about 4 months ago.)    HPI:  Ms. Crys Steel is a(n) 22 year old female who presents today as a return patient for follow-up. Last seen by me on 11/30/2022, at which time patient was started on nbUVB for treatment of severe atopic dermatitis.    Today, patient reports that her dermatitis has been improved. She describes it as her skin feeling stronger and having more of a barrier. She has still been itchy. She has been mostly avoiding topicals, but she has needed to use them occasionally.    Patient is otherwise feeling well, without additional skin concerns.    Labs Reviewed:  N/A    Physical Exam:  Vitals: There were no vitals taken for this visit.  SKIN: Sun-exposed skin, which includes the head/face, neck, both arms, digits, and/or nails was examined.    - Scattered pink scaly thin plaques on the face, antecubital fossa, lower extremities. Improved from prior.  - No other lesions of concern on areas examined.     Medications:  Current Outpatient Medications   Medication    ALEJANDRO FE 1/20 1-20 MG-MCG tablet    mupirocin (BACTROBAN) 2 % external ointment    tacrolimus (PROTOPIC) 0.03 % external ointment    tacrolimus (PROTOPIC) 0.03 % external ointment    triamcinolone (KENALOG) 0.025 % external ointment    triamcinolone (KENALOG) 0.025 % external ointment    triamcinolone (KENALOG) 0.1 % external ointment     No current facility-administered medications for this visit.      Past Medical History:   There is no problem list  on file for this patient.    History reviewed. No pertinent past medical history.

## 2023-05-25 NOTE — NURSING NOTE
Chief Complaint   Patient presents with     Eczema     Pt following up after starting light therapy for eczema about 4 months ago.     Wade Mcmillan, EMT     Dapsone Pregnancy And Lactation Text: This medication is Pregnancy Category C and is not considered safe during pregnancy or breast feeding. High Dose Vitamin A Counseling: Side effects reviewed, pt to contact office should one occur. Spironolactone Counseling: Patient advised regarding risks of diarrhea, abdominal pain, hyperkalemia, birth defects (for female patients), liver toxicity and renal toxicity. The patient may need blood work to monitor liver and kidney function and potassium levels while on therapy. The patient verbalized understanding of the proper use and possible adverse effects of spironolactone.  All of the patient's questions and concerns were addressed. Dapsone Counseling: I discussed with the patient the risks of dapsone including but not limited to hemolytic anemia, agranulocytosis, rashes, methemoglobinemia, kidney failure, peripheral neuropathy, headaches, GI upset, and liver toxicity.  Patients who start dapsone require monitoring including baseline LFTs and weekly CBCs for the first month, then every month thereafter.  The patient verbalized understanding of the proper use and possible adverse effects of dapsone.  All of the patient's questions and concerns were addressed. Topical Clindamycin Pregnancy And Lactation Text: This medication is Pregnancy Category B and is considered safe during pregnancy. It is unknown if it is excreted in breast milk. Include Pregnancy/Lactation Warning?: No Azithromycin Counseling:  I discussed with the patient the risks of azithromycin including but not limited to GI upset, allergic reaction, drug rash, diarrhea, and yeast infections. Topical Sulfur Applications Counseling: Topical Sulfur Counseling: Patient counseled that this medication may cause skin irritation or allergic reactions.  In the event of skin irritation, the patient was advised to reduce the amount of the drug applied or use it less frequently.   The patient verbalized understanding of the proper use and possible adverse effects of topical sulfur application.  All of the patient's questions and concerns were addressed. Tetracycline Counseling: Patient counseled regarding possible photosensitivity and increased risk for sunburn.  Patient instructed to avoid sunlight, if possible.  When exposed to sunlight, patients should wear protective clothing, sunglasses, and sunscreen.  The patient was instructed to call the office immediately if the following severe adverse effects occur:  hearing changes, easy bruising/bleeding, severe headache, or vision changes.  The patient verbalized understanding of the proper use and possible adverse effects of tetracycline.  All of the patient's questions and concerns were addressed. Patient understands to avoid pregnancy while on therapy due to potential birth defects. Spironolactone Pregnancy And Lactation Text: This medication can cause feminization of the male fetus and should be avoided during pregnancy. The active metabolite is also found in breast milk. Topical Sulfur Applications Pregnancy And Lactation Text: This medication is Pregnancy Category C and has an unknown safety profile during pregnancy. It is unknown if this topical medication is excreted in breast milk. Birth Control Pills Pregnancy And Lactation Text: This medication should be avoided if pregnant and for the first 30 days post-partum. Doxycycline Counseling:  Patient counseled regarding possible photosensitivity and increased risk for sunburn.  Patient instructed to avoid sunlight, if possible.  When exposed to sunlight, patients should wear protective clothing, sunglasses, and sunscreen.  The patient was instructed to call the office immediately if the following severe adverse effects occur:  hearing changes, easy bruising/bleeding, severe headache, or vision changes.  The patient verbalized understanding of the proper use and possible adverse effects of doxycycline.  All of the patient's questions and concerns were addressed. Isotretinoin Counseling: Patient should get monthly blood tests, not donate blood, not drive at night if vision affected, not share medication, and not undergo elective surgery for 6 months after tx completed. Side effects reviewed, pt to contact office should one occur. Topical Retinoid Pregnancy And Lactation Text: This medication is Pregnancy Category C. It is unknown if this medication is excreted in breast milk. Azithromycin Pregnancy And Lactation Text: This medication is considered safe during pregnancy and is also secreted in breast milk. Topical Clindamycin Counseling: Patient counseled that this medication may cause skin irritation or allergic reactions.  In the event of skin irritation, the patient was advised to reduce the amount of the drug applied or use it less frequently.   The patient verbalized understanding of the proper use and possible adverse effects of clindamycin.  All of the patient's questions and concerns were addressed. Tazorac Counseling:  Patient advised that medication is irritating and drying.  Patient may need to apply sparingly and wash off after an hour before eventually leaving it on overnight.  The patient verbalized understanding of the proper use and possible adverse effects of tazorac.  All of the patient's questions and concerns were addressed. Birth Control Pills Counseling: Birth Control Pill Counseling: I discussed with the patient the potential side effects of OCPs including but not limited to increased risk of stroke, heart attack, thrombophlebitis, deep venous thrombosis, hepatic adenomas, breast changes, GI upset, headaches, and depression.  The patient verbalized understanding of the proper use and possible adverse effects of OCPs. All of the patient's questions and concerns were addressed. Detail Level: Zone Isotretinoin Pregnancy And Lactation Text: This medication is Pregnancy Category X and is considered extremely dangerous during pregnancy. It is unknown if it is excreted in breast milk. Benzoyl Peroxide Pregnancy And Lactation Text: This medication is Pregnancy Category C. It is unknown if benzoyl peroxide is excreted in breast milk. Minocycline Counseling: Patient advised regarding possible photosensitivity and discoloration of the teeth, skin, lips, tongue and gums.  Patient instructed to avoid sunlight, if possible.  When exposed to sunlight, patients should wear protective clothing, sunglasses, and sunscreen.  The patient was instructed to call the office immediately if the following severe adverse effects occur:  hearing changes, easy bruising/bleeding, severe headache, or vision changes.  The patient verbalized understanding of the proper use and possible adverse effects of minocycline.  All of the patient's questions and concerns were addressed. Erythromycin Pregnancy And Lactation Text: This medication is Pregnancy Category B and is considered safe during pregnancy. It is also excreted in breast milk. Doxycycline Pregnancy And Lactation Text: This medication is Pregnancy Category D and not consider safe during pregnancy. It is also excreted in breast milk but is considered safe for shorter treatment courses. Tetracycline Pregnancy And Lactation Text: This medication is Pregnancy Category D and not consider safe during pregnancy. It is also excreted in breast milk. Benzoyl Peroxide Counseling: Patient counseled that medicine may cause skin irritation and bleach clothing.  In the event of skin irritation, the patient was advised to reduce the amount of the drug applied or use it less frequently.   The patient verbalized understanding of the proper use and possible adverse effects of benzoyl peroxide.  All of the patient's questions and concerns were addressed. Bactrim Counseling:  I discussed with the patient the risks of sulfa antibiotics including but not limited to GI upset, allergic reaction, drug rash, diarrhea, dizziness, photosensitivity, and yeast infections.  Rarely, more serious reactions can occur including but not limited to aplastic anemia, agranulocytosis, methemoglobinemia, blood dyscrasias, liver or kidney failure, lung infiltrates or desquamative/blistering drug rashes. Erythromycin Counseling:  I discussed with the patient the risks of erythromycin including but not limited to GI upset, allergic reaction, drug rash, diarrhea, increase in liver enzymes, and yeast infections. Bactrim Pregnancy And Lactation Text: This medication is Pregnancy Category D and is known to cause fetal risk.  It is also excreted in breast milk. Topical Retinoid counseling:  Patient advised to apply a pea-sized amount only at bedtime and wait 30 minutes after washing their face before applying.  If too drying, patient may add a non-comedogenic moisturizer. The patient verbalized understanding of the proper use and possible adverse effects of retinoids.  All of the patient's questions and concerns were addressed. Tazorac Pregnancy And Lactation Text: This medication is not safe during pregnancy. It is unknown if this medication is excreted in breast milk. High Dose Vitamin A Pregnancy And Lactation Text: High dose vitamin A therapy is contraindicated during pregnancy and breast feeding.

## 2023-05-31 ENCOUNTER — TELEPHONE (OUTPATIENT)
Dept: DERMATOLOGY | Facility: CLINIC | Age: 23
End: 2023-05-31
Payer: COMMERCIAL

## 2023-05-31 NOTE — TELEPHONE ENCOUNTER
Patient voice mail not set up. Sent my chart msg for patient to scheduled the following:    Appointment type:[facility] Lights  Provider:   Return date: June 2023  Specialty phone number: [phone]

## 2023-06-01 ENCOUNTER — ALLIED HEALTH/NURSE VISIT (OUTPATIENT)
Dept: DERMATOLOGY | Facility: CLINIC | Age: 23
End: 2023-06-01
Payer: COMMERCIAL

## 2023-06-01 DIAGNOSIS — L20.9 ATOPIC DERMATITIS, UNSPECIFIED TYPE: ICD-10-CM

## 2023-06-01 PROCEDURE — 96900 ACTINOTHERAPY UV LIGHT: CPT | Performed by: DERMATOLOGY

## 2023-06-01 PROCEDURE — 99207 PR NO CHARGE NURSE ONLY: CPT | Performed by: DERMATOLOGY

## 2023-06-01 NOTE — PROGRESS NOTES
Crys Steel comes into clinic today at the request of Dr. Early, ordering provider for phototherapy.    This service provided today was under the supervising provider of the day Dr. Kilgore, who was available if needed.    [facility] Dermatology Phototherapy Record  1. Crys Steel is a 22 year old female is here today for phototherapy (UVB) treatment for Atopic dermatitis.        Changes or new medications since last treatment (If yes, notify MD):  NO    New medical conditions (If yes, notify MD):  NO    Any problems with last phototherapy treatment (If yes, notify MD)?  NO    Patient denies any remaining skin redness since last treatment (If no, do not treat. Do not treat red skin):  YES    Did staff apply any topicals on patient?  NO  If yes, which topical?      Did patient self apply any topicals?  NO  If yes, which topical?      The patient was offered umdermhuvbhandfoot or umdermhuvbfullbody AVS : N/A.     2. The patient tolerated phototherapy without complication.    Patient will return per protocol for next UVB treatment, per protocol.     Patient to see provider every 4-12 weeks for follow-up during treatment (if no, notify treating physician):  YES.     All questions and concerns discussed with patient in clinic today.    Tati Medellin RN

## 2023-06-05 ENCOUNTER — ALLIED HEALTH/NURSE VISIT (OUTPATIENT)
Dept: DERMATOLOGY | Facility: CLINIC | Age: 23
End: 2023-06-05
Payer: COMMERCIAL

## 2023-06-05 DIAGNOSIS — L20.9 ATOPIC DERMATITIS, UNSPECIFIED TYPE: ICD-10-CM

## 2023-06-05 PROCEDURE — 99207 PR NO CHARGE NURSE ONLY: CPT | Performed by: DERMATOLOGY

## 2023-06-05 PROCEDURE — 96900 ACTINOTHERAPY UV LIGHT: CPT | Performed by: DERMATOLOGY

## 2023-06-05 NOTE — PROGRESS NOTES
Crys Steel comes into clinic today at the request of Dr. Early, ordering provider for phototherapy.    This service provided today was under the supervising provider of the day Dr. Kilgore, who was available if needed.    [facility] Dermatology Phototherapy Record  1. Crsy Steel is a 22 year old female is here today for phototherapy (UVB) treatment for Atopic dermatitis.        Changes or new medications since last treatment (If yes, notify MD):  NO    New medical conditions (If yes, notify MD):  NO    Any problems with last phototherapy treatment (If yes, notify MD)?  NO    Patient denies any remaining skin redness since last treatment (If no, do not treat. Do not treat red skin):  YES    Did staff apply any topicals on patient?  NO  If yes, which topical?      Did patient self apply any topicals?  NO  If yes, which topical?      The patient was offered umdermhuvbhandfoot or umdermhuvbfullbody AVS : N/A.     2. The patient tolerated phototherapy without complication.    Patient will return per protocol for next UVB treatment, per protocol.     Patient to see provider every 4-12 weeks for follow-up during treatment (if no, notify treating physician):  YES.     All questions and concerns discussed with patient in clinic today.    Tati Medellin RN

## 2023-06-08 ENCOUNTER — ALLIED HEALTH/NURSE VISIT (OUTPATIENT)
Dept: DERMATOLOGY | Facility: CLINIC | Age: 23
End: 2023-06-08
Payer: COMMERCIAL

## 2023-06-08 DIAGNOSIS — L20.9 ATOPIC DERMATITIS, UNSPECIFIED TYPE: ICD-10-CM

## 2023-06-08 PROCEDURE — 96900 ACTINOTHERAPY UV LIGHT: CPT | Performed by: DERMATOLOGY

## 2023-06-08 PROCEDURE — 99207 PR NO CHARGE NURSE ONLY: CPT | Performed by: DERMATOLOGY

## 2023-06-08 NOTE — PROGRESS NOTES
Crys Steel comes into clinic today at the request of Dr. Early, ordering provider for phototherapy.    This service provided today was under the supervising provider of the day Dr. Kilgore, who was available if needed.    [facility] Dermatology Phototherapy Record  1. Crys Steel is a 22 year old female is here today for phototherapy (UVB) treatment for Atopic dermatitis.        Changes or new medications since last treatment (If yes, notify MD):  NO    New medical conditions (If yes, notify MD):  NO    Any problems with last phototherapy treatment (If yes, notify MD)?  NO    Patient denies any remaining skin redness since last treatment (If no, do not treat. Do not treat red skin):  YES    Did staff apply any topicals on patient?  NO  If yes, which topical?      Did patient self apply any topicals?  NO  If yes, which topical?      The patient was offered umdermhuvbhandfoot or umdermhuvbfullbody AVS : N/A.     2. The patient tolerated phototherapy without complication.    Patient will return per protocol for next UVB treatment, per protocol.     Patient to see provider every 4-12 weeks for follow-up during treatment (if no, notify treating physician):  YES.     All questions and concerns discussed with patient in clinic today.    Tati Medellin RN

## 2023-06-12 ENCOUNTER — ALLIED HEALTH/NURSE VISIT (OUTPATIENT)
Dept: DERMATOLOGY | Facility: CLINIC | Age: 23
End: 2023-06-12
Payer: COMMERCIAL

## 2023-06-12 DIAGNOSIS — L20.9 ATOPIC DERMATITIS, UNSPECIFIED TYPE: ICD-10-CM

## 2023-06-12 PROCEDURE — 96900 ACTINOTHERAPY UV LIGHT: CPT | Performed by: DERMATOLOGY

## 2023-06-12 PROCEDURE — 99207 PR NO CHARGE NURSE ONLY: CPT | Performed by: DERMATOLOGY

## 2023-06-12 NOTE — PROGRESS NOTES
Crys Steel comes into clinic today at the request of Dr. Early, ordering provider for phototherapy.    This service provided today was under the supervising provider of the day Dr. Kilgore, who was available if needed.    [facility] Dermatology Phototherapy Record  1. Crys Steel is a 22 year old female is here today for phototherapy (UVB) treatment for Atopic dermatitis, unspecified type.        Changes or new medications since last treatment (If yes, notify MD):  NO    New medical conditions (If yes, notify MD):  NO    Any problems with last phototherapy treatment (If yes, notify MD)?  NO    Patient denies any remaining skin redness since last treatment (If no, do not treat. Do not treat red skin):  YES    Did staff apply any topicals on patient?  NO  If yes, which topical?      Did patient self apply any topicals?  NO  If yes, which topical?      The patient was offered umdermhuvbhandfoot or umdermhuvbfullbody AVS : N/A.     2. The patient tolerated phototherapy without complication.    Patient will return per protocol for next UVB treatment, per protocol.     Patient to see provider every 4-12 weeks for follow-up during treatment (if no, notify treating physician):  YES.     All questions and concerns discussed with patient in clinic today.    Tati Medellin RN

## 2023-06-15 ENCOUNTER — ALLIED HEALTH/NURSE VISIT (OUTPATIENT)
Dept: DERMATOLOGY | Facility: CLINIC | Age: 23
End: 2023-06-15
Payer: COMMERCIAL

## 2023-06-15 DIAGNOSIS — L20.9 ATOPIC DERMATITIS, UNSPECIFIED TYPE: ICD-10-CM

## 2023-06-15 PROCEDURE — 99207 PR NO CHARGE NURSE ONLY: CPT | Performed by: DERMATOLOGY

## 2023-06-15 PROCEDURE — 96900 ACTINOTHERAPY UV LIGHT: CPT | Performed by: DERMATOLOGY

## 2023-06-15 NOTE — PROGRESS NOTES
Crys Steel comes into clinic today at the request of Dr. Early, ordering provider for phototherapy.    This service provided today was under the supervising provider of the day Dr. Kilgore, who was available if needed.    [facility] Dermatology Phototherapy Record  1. Crys Steel is a 22 year old female is here today for phototherapy (UVB) treatment for Atopic dermatitis.        Changes or new medications since last treatment (If yes, notify MD):  NO    New medical conditions (If yes, notify MD):  NO    Any problems with last phototherapy treatment (If yes, notify MD)?  NO    Patient denies any remaining skin redness since last treatment (If no, do not treat. Do not treat red skin):  YES    Did staff apply any topicals on patient?  NO  If yes, which topical?      Did patient self apply any topicals?  NO  If yes, which topical?      The patient was offered umdermhuvbhandfoot or umdermhuvbfullbody AVS : N/A.     2. The patient tolerated phototherapy without complication.    Patient will return per protocol for next UVB treatment, per protocol.     Patient to see provider every 4-12 weeks for follow-up during treatment (if no, notify treating physician):  YES.     All questions and concerns discussed with patient in clinic today.    Tati Medellin RN

## 2023-06-19 ENCOUNTER — ALLIED HEALTH/NURSE VISIT (OUTPATIENT)
Dept: DERMATOLOGY | Facility: CLINIC | Age: 23
End: 2023-06-19
Payer: COMMERCIAL

## 2023-06-19 DIAGNOSIS — L20.9 ATOPIC DERMATITIS, UNSPECIFIED TYPE: ICD-10-CM

## 2023-06-19 PROCEDURE — 99207 PR NO CHARGE NURSE ONLY: CPT | Performed by: DERMATOLOGY

## 2023-06-19 PROCEDURE — 96900 ACTINOTHERAPY UV LIGHT: CPT | Performed by: DERMATOLOGY

## 2023-06-19 NOTE — PROGRESS NOTES
Crys Steel comes into clinic today at the request of Dr. Early, ordering provider for phototherapy.    This service provided today was under the supervising provider of the day Dr. Watts, who was available if needed.    [facility] Dermatology Phototherapy Record  1. Crys Steel is a 22 year old female is here today for phototherapy (UVB) treatment for Atopic dermatitis.        Changes or new medications since last treatment (If yes, notify MD):  NO    New medical conditions (If yes, notify MD):  NO    Any problems with last phototherapy treatment (If yes, notify MD)?  NO    Patient denies any remaining skin redness since last treatment (If no, do not treat. Do not treat red skin):  YES    Did staff apply any topicals on patient?  NO  If yes, which topical?      Did patient self apply any topicals?  NO  If yes, which topical?      The patient was offered umdermhuvbhandfoot or umdermhuvbfullbody AVS : N/A.     2. The patient tolerated phototherapy without complication.    Patient will return per protocol for next UVB treatment, per protocol.     Patient to see provider every 4-12 weeks for follow-up during treatment (if no, notify treating physician):  YES.     All questions and concerns discussed with patient in clinic today.    Tati Medellin RN

## 2023-06-22 ENCOUNTER — ALLIED HEALTH/NURSE VISIT (OUTPATIENT)
Dept: DERMATOLOGY | Facility: CLINIC | Age: 23
End: 2023-06-22
Payer: COMMERCIAL

## 2023-06-22 DIAGNOSIS — L20.9 ATOPIC DERMATITIS, UNSPECIFIED TYPE: ICD-10-CM

## 2023-06-22 PROCEDURE — 99207 PR NO CHARGE NURSE ONLY: CPT | Performed by: DERMATOLOGY

## 2023-06-22 PROCEDURE — 96900 ACTINOTHERAPY UV LIGHT: CPT | Performed by: DERMATOLOGY

## 2023-06-22 NOTE — PROGRESS NOTES
Crys Steel comes into clinic today at the request of Dr. Early, ordering provider for phototherapy.    This service provided today was under the supervising provider of the day Dr. Kilgore, who was available if needed.    [facility] Dermatology Phototherapy Record  1. Crys Steel is a 22 year old female is here today for phototherapy (UVB) treatment for Atopic dermatitis.        Changes or new medications since last treatment (If yes, notify MD):  NO    New medical conditions (If yes, notify MD):  NO    Any problems with last phototherapy treatment (If yes, notify MD)?  NO    Patient denies any remaining skin redness since last treatment (If no, do not treat. Do not treat red skin):  YES    Did staff apply any topicals on patient?  NO  If yes, which topical?      Did patient self apply any topicals?  NO  If yes, which topical?      The patient was offered umdermhuvbhandfoot or umdermhuvbfullbody AVS : N/A.     2. The patient tolerated phototherapy without complication.    Patient will return per protocol for next UVB treatment, per protocol.     Patient to see provider every 4-12 weeks for follow-up during treatment (if no, notify treating physician):  YES.     All questions and concerns discussed with patient in clinic today.    Tati Medellin RN

## 2023-06-26 ENCOUNTER — ALLIED HEALTH/NURSE VISIT (OUTPATIENT)
Dept: DERMATOLOGY | Facility: CLINIC | Age: 23
End: 2023-06-26
Payer: COMMERCIAL

## 2023-06-26 DIAGNOSIS — L20.9 ATOPIC DERMATITIS, UNSPECIFIED TYPE: ICD-10-CM

## 2023-06-26 PROCEDURE — 96900 ACTINOTHERAPY UV LIGHT: CPT | Performed by: DERMATOLOGY

## 2023-06-26 PROCEDURE — 99207 PR NO CHARGE NURSE ONLY: CPT | Performed by: DERMATOLOGY

## 2023-06-26 NOTE — PROGRESS NOTES
Crys Steel comes into clinic today at the request of Dr. Early, ordering provider for phototherapy.    This service provided today was under the supervising provider of the day Dr. Kilgore, who was available if needed.    [facility] Dermatology Phototherapy Record  1. Crys Steel is a 22 year old female is here today for phototherapy (UVB) treatment for Atopic dermatitis .        Changes or new medications since last treatment (If yes, notify MD):  NO    New medical conditions (If yes, notify MD):  NO    Any problems with last phototherapy treatment?  YES-Pt noted some mild erythema and peeling after last light treatment. Kept at same dose per protocol.     Patient denies any remaining skin redness since last treatment (If no, do not treat. Do not treat red skin):  YES    Did staff apply any topicals on patient?  NO  If yes, which topical?      Did patient self apply any topicals?  NO  If yes, which topical?      The patient was offered [facility] : N/A.     2. The patient tolerated phototherapy without complication.    Patient will return per protocol for next UVB treatment, per protocol.     Patient to see provider every 4-12 weeks for follow-up during treatment (if no, notify treating physician):  YES.     All questions and concerns discussed with patient in clinic today.    Tati Medellin RN

## 2023-06-29 ENCOUNTER — ALLIED HEALTH/NURSE VISIT (OUTPATIENT)
Dept: DERMATOLOGY | Facility: CLINIC | Age: 23
End: 2023-06-29
Payer: COMMERCIAL

## 2023-06-29 DIAGNOSIS — L20.9 ATOPIC DERMATITIS, UNSPECIFIED TYPE: ICD-10-CM

## 2023-06-29 PROCEDURE — 96900 ACTINOTHERAPY UV LIGHT: CPT | Performed by: DERMATOLOGY

## 2023-06-29 PROCEDURE — 99207 PR NO CHARGE NURSE ONLY: CPT | Performed by: DERMATOLOGY

## 2023-06-29 NOTE — PROGRESS NOTES
Crys Steel comes into clinic today at the request of Dr. Kilgore, ordering provider for phototherapy.    This service provided today was under the supervising provider of the day Dr. Early, who was available if needed.    [facility] Dermatology Phototherapy Record  1. Crys Steel is a 22 year old female is here today for phototherapy (UVB) treatment for Atopic dermatitis.        Changes or new medications since last treatment (If yes, notify MD):  NO    New medical conditions (If yes, notify MD):  NO    Any problems with last phototherapy treatment (If yes, notify MD)?  NO    Patient denies any remaining skin redness since last treatment (If no, do not treat. Do not treat red skin):  YES    Did staff apply any topicals on patient?  NO  If yes, which topical?      Did patient self apply any topicals?  NO  If yes, which topical?      The patient was offered umdermhuvbhandfoot or umdermhuvbfullbody AVS : N/A.     2. The patient tolerated phototherapy without complication.    Patient will return per protocol for next UVB treatment, per protocol.     Patient to see provider every 4-12 weeks for follow-up during treatment (if no, notify treating physician):  YES.     All questions and concerns discussed with patient in clinic today.    Tati Medellin RN

## 2023-07-06 ENCOUNTER — ALLIED HEALTH/NURSE VISIT (OUTPATIENT)
Dept: DERMATOLOGY | Facility: CLINIC | Age: 23
End: 2023-07-06
Payer: COMMERCIAL

## 2023-07-06 DIAGNOSIS — L20.9 ATOPIC DERMATITIS, UNSPECIFIED TYPE: ICD-10-CM

## 2023-07-06 PROCEDURE — 96900 ACTINOTHERAPY UV LIGHT: CPT | Performed by: STUDENT IN AN ORGANIZED HEALTH CARE EDUCATION/TRAINING PROGRAM

## 2023-07-06 PROCEDURE — 99207 PR NO CHARGE NURSE ONLY: CPT | Performed by: STUDENT IN AN ORGANIZED HEALTH CARE EDUCATION/TRAINING PROGRAM

## 2023-07-06 NOTE — PROGRESS NOTES
Crys Steel comes into clinic today at the request of Dr. Early, ordering provider for phototherapy.    This service provided today was under the supervising provider of the day Dr. BRITTON CARNEY, who was available if needed.    [facility] Dermatology Phototherapy Record  1. Crys Steel is a 22 year old female is here today for phototherapy (UVB) treatment forAtopic dermatitis, unspecified type [L20.9]  - Primary  .        Changes or new medications since last treatment (If yes, notify MD):  NO    New medical conditions (If yes, notify MD):  NO    Any problems with last phototherapy treatment (If yes, notify MD)?  NO    Patient denies any remaining skin redness since last treatment (If no, do not treat. Do not treat red skin):  YES    Did staff apply any topicals on patient?  NO  If yes, which topical?      Did patient self apply any topicals?  NO  If yes, which topical?      The patient was offered umderuvbhandfoot or umderuvbfullbody AVS : N/A.     2. The patient tolerated phototherapy without complication.    Patient will return per protocol for next UVB treatment, per protocol.     Patient to see provider every 4-12 weeks for follow-up during treatment (if no, notify treating physician):  YES.     All questions and concerns discussed with patient in clinic today.    Ashley Sharma, CMA

## 2023-07-17 ENCOUNTER — ALLIED HEALTH/NURSE VISIT (OUTPATIENT)
Dept: DERMATOLOGY | Facility: CLINIC | Age: 23
End: 2023-07-17
Payer: COMMERCIAL

## 2023-07-17 DIAGNOSIS — L20.9 ATOPIC DERMATITIS, UNSPECIFIED TYPE: ICD-10-CM

## 2023-07-17 PROCEDURE — 99207 PR NO CHARGE NURSE ONLY: CPT | Performed by: DERMATOLOGY

## 2023-07-17 PROCEDURE — 96900 ACTINOTHERAPY UV LIGHT: CPT | Performed by: DERMATOLOGY

## 2023-07-17 NOTE — PROGRESS NOTES
Crys Steel comes into clinic today at the request of Dr. Early, ordering provider for phototherapy.    This service provided today was under the supervising provider of the day Dr. Kilgore, who was available if needed.    [facility] Dermatology Phototherapy Record  1. Crys Steel is a 22 year old female is here today for phototherapy (UVB) treatment for Atopic dermatitis, unspecified type.        Changes or new medications since last treatment (If yes, notify MD):  NO    New medical conditions (If yes, notify MD):  NO    Any problems with last phototherapy treatment (If yes, notify MD)?  NO    Patient denies any remaining skin redness since last treatment (If no, do not treat. Do not treat red skin):  YES    Did staff apply any topicals on patient?  NO  If yes, which topical?      Did patient self apply any topicals?  NO  If yes, which topical?      The patient was offered umdermhuvbhandfoot or umdermhuvbfullbody AVS : N/A.     2. The patient tolerated phototherapy without complication.    Patient will return per protocol for next UVB treatment, per protocol.     Patient to see provider every 4-12 weeks for follow-up during treatment (if no, notify treating physician):  YES.     All questions and concerns discussed with patient in clinic today.    Tati Medellin RN

## 2023-09-10 ENCOUNTER — HEALTH MAINTENANCE LETTER (OUTPATIENT)
Age: 23
End: 2023-09-10

## 2023-11-27 NOTE — PROGRESS NOTES
[facility] Dermatology Phototherapy Record  1. Crys Steel is a 22 year old female is here today for phototherapy (NBUVB) treatment for Atopic dermatitis.        Changes or new medications since last treatment (If yes, notify MD):  NO    New medical conditions (If yes, notify MD):  NO    Any problems with last phototherapy treatment (If yes, notify MD)?  NO    Patient denies any remaining skin redness since last treatment (If no, do not treat. Do not treat red skin):  YES    Did staff apply any topicals on patient?  NO      Did patient self apply any topicals?  NO      2. The patient tolerated phototherapy without complication.    Patient will return per protocol for next NBUVB treatment, per protocol.     Patient to see provider every 4-12 weeks for follow-up during treatment (if no, notify treating physician):  YES.     All questions and concerns discussed with patient in clinic today.    Crys Steel comes into clinic today at the request of Dr. Early Ordering Provider for NBUVB    This service provided today was under the supervising provider of the day Dr. Kilgore, who was available if needed.    Jennifer Boucher MA  
27-Nov-2023 12:17

## 2025-02-01 ENCOUNTER — HEALTH MAINTENANCE LETTER (OUTPATIENT)
Age: 25
End: 2025-02-01